# Patient Record
Sex: FEMALE | Race: BLACK OR AFRICAN AMERICAN | NOT HISPANIC OR LATINO | Employment: PART TIME | URBAN - METROPOLITAN AREA
[De-identification: names, ages, dates, MRNs, and addresses within clinical notes are randomized per-mention and may not be internally consistent; named-entity substitution may affect disease eponyms.]

---
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
## 2018-05-02 ENCOUNTER — OFFICE VISIT (OUTPATIENT)
Dept: FAMILY MEDICINE | Facility: CLINIC | Age: 18
End: 2018-05-02
Payer: COMMERCIAL

## 2018-05-02 VITALS
WEIGHT: 150 LBS | HEART RATE: 64 BPM | HEIGHT: 65 IN | TEMPERATURE: 98 F | OXYGEN SATURATION: 98 % | DIASTOLIC BLOOD PRESSURE: 62 MMHG | BODY MASS INDEX: 24.99 KG/M2 | RESPIRATION RATE: 18 BRPM | SYSTOLIC BLOOD PRESSURE: 98 MMHG

## 2018-05-02 DIAGNOSIS — Z02.0 SCHOOL PHYSICAL EXAM: Primary | ICD-10-CM

## 2018-05-02 DIAGNOSIS — Z23 NEED FOR TDAP VACCINATION: ICD-10-CM

## 2018-05-02 DIAGNOSIS — Z23 NEED FOR MENINGOCOCCAL VACCINATION: ICD-10-CM

## 2018-05-02 DIAGNOSIS — T78.40XA ALLERGIC STATE, INITIAL ENCOUNTER: ICD-10-CM

## 2018-05-02 PROCEDURE — 99999 PR PBB SHADOW E&M-EST. PATIENT-LVL III: CPT | Mod: PBBFAC,,, | Performed by: FAMILY MEDICINE

## 2018-05-02 PROCEDURE — 90472 IMMUNIZATION ADMIN EACH ADD: CPT | Mod: S$GLB,,, | Performed by: FAMILY MEDICINE

## 2018-05-02 PROCEDURE — 99214 OFFICE O/P EST MOD 30 MIN: CPT | Mod: 25,S$GLB,, | Performed by: FAMILY MEDICINE

## 2018-05-02 PROCEDURE — 90471 IMMUNIZATION ADMIN: CPT | Mod: S$GLB,,, | Performed by: FAMILY MEDICINE

## 2018-05-02 PROCEDURE — 90715 TDAP VACCINE 7 YRS/> IM: CPT | Mod: S$GLB,,, | Performed by: FAMILY MEDICINE

## 2018-05-02 PROCEDURE — 90734 MENACWYD/MENACWYCRM VACC IM: CPT | Mod: S$GLB,,, | Performed by: FAMILY MEDICINE

## 2018-05-02 PROCEDURE — 3008F BODY MASS INDEX DOCD: CPT | Mod: CPTII,S$GLB,, | Performed by: FAMILY MEDICINE

## 2018-05-02 RX ORDER — FLUTICASONE PROPIONATE 50 MCG
1 SPRAY, SUSPENSION (ML) NASAL 2 TIMES DAILY
Qty: 1 BOTTLE | Refills: 1 | Status: SHIPPED | OUTPATIENT
Start: 2018-05-02

## 2018-05-02 RX ORDER — DESLORATADINE 5 MG/1
5 TABLET ORAL DAILY
Qty: 30 TABLET | Refills: 11 | Status: SHIPPED | OUTPATIENT
Start: 2018-05-02 | End: 2018-05-24 | Stop reason: SDUPTHER

## 2018-05-02 RX ORDER — OMEPRAZOLE 10 MG/1
10 CAPSULE, DELAYED RELEASE ORAL DAILY
COMMUNITY
End: 2018-10-17

## 2018-05-02 NOTE — PROGRESS NOTES
HPI:  Dagmar Quach is a 17 y.o. year old female that  presents problemsfro school physical. She denies any   Chief Complaint   Patient presents with    Annual Exam     school physical    Establish Care     tetanus/menactra given   .     HPI    Past Medical History:   Diagnosis Date    Lactose intolerance      Social History     Social History    Marital status: Single     Spouse name: N/A    Number of children: N/A    Years of education: N/A     Occupational History    Not on file.     Social History Main Topics    Smoking status: Current Every Day Smoker     Packs/day: 0.05     Types: Cigars    Smokeless tobacco: Never Used    Alcohol use No    Drug use: No      Comment: 3-5 WEEKS    Sexual activity: Yes     Partners: Male     Other Topics Concern    Are You Pregnant Or Think You May Be? No     Social History Narrative    Lives with aunt now. 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     Past Surgical History:   Procedure Laterality Date    WISDOM TOOTH EXTRACTION       Family History   Problem Relation Age of Onset    No Known Problems Mother     No Known Problems Father     Migraines Sister     No Known Problems Sister     Melanoma Neg Hx            Review of Systems  General ROS: negative for chills, fever or weight loss  Psychological ROS: negative for hallucination, depression or suicidal ideation  Ophthalmic ROS: negative for blurry vision, photophobia or eye pain  ENT ROS: negative for epistaxis, sore throat or rhinorrhea  Respiratory ROS: no cough, shortness of breath, or wheezing  Cardiovascular ROS: no chest pain or dyspnea on exertion  Gastrointestinal ROS: no abdominal pain, change in bowel habits, or black/ bloody stools  Genito-Urinary ROS: no dysuria, trouble voiding, or hematuria  Musculoskeletal ROS: negative for gait disturbance or muscular weakness  Neurological ROS: no syncope or seizures; no ataxia  Dermatological ROS: negative for pruritis, rash and  "jaundice      Physical Exam:  BP 98/62 (BP Location: Right arm, Patient Position: Sitting, BP Method: Medium (Manual))   Pulse 64   Temp 98 °F (36.7 °C) (Oral)   Resp 18   Ht 5' 5" (1.651 m)   Wt 68 kg (150 lb)   LMP 04/30/2018 (Exact Date)   SpO2 98%   BMI 24.96 kg/m²   General appearance: alert, cooperative, no distress  Constitutional:Oriented to person, place, and time.appears well-developed and well-nourished.  HEENT: Normocephalic, atraumatic, neck symmetrical, no nasal discharge, TM - clear bilaterally   Eyes: conjunctivae/corneas clear, PERRL, EOM's intact  Lungs: clear to auscultation bilaterally, no dullness to percussion bilaterally  Heart: regular rate and rhythm without rub; no displacement of the PMI   Abdomen: soft, non-tender; bowel sounds normoactive; no organomegaly  Extremities: extremities symmetric; no clubbing, cyanosis, or edema  Integument: Skin color, texture, turgor normal; no rashes; hair distrubution normal  Neurologic: Alert and oriented X 3, normal strength, normal coordination and gait  Psychiatric: no pressured speech; normal affect; no evidence of impaired cognition   Physical Exam  LABS:    Complete Blood Count  Lab Results   Component Value Date    RBC 4.79 04/19/2017    HGB 13.9 04/19/2017    HCT 40.9 04/19/2017    MCV 85 04/19/2017    MCH 29.0 04/19/2017    MCHC 34.0 04/19/2017    RDW 12.8 04/19/2017     04/19/2017    MPV 9.8 04/19/2017    GRAN 6.1 04/19/2017    GRAN 63.1 04/19/2017    LYMPH 2.8 04/19/2017    LYMPH 28.7 04/19/2017    MONO 0.7 04/19/2017    MONO 7.6 04/19/2017    EOS 0.0 04/19/2017    BASO 0.02 04/19/2017    EOSINOPHIL 0.2 04/19/2017    BASOPHIL 0.2 04/19/2017    DIFFMETHOD Automated 04/19/2017       Comprehensive Metabolic Panel  Lab Results   Component Value Date     04/19/2017    BUN 9 04/19/2017    CREATININE 1.0 04/19/2017     04/19/2017    K 3.3 (L) 04/19/2017     04/19/2017    PROT 7.4 04/19/2017    ALBUMIN 4.0 04/19/2017 "    BILITOT 0.6 04/19/2017    AST 21 04/19/2017    ALKPHOS 54 04/19/2017    CO2 20 (L) 04/19/2017    ALT 9 (L) 04/19/2017    ANIONGAP 12 04/19/2017    EGFRNONAA >60 04/19/2017    ESTGFRAFRICA >60 04/19/2017       LIPID  No results found for: CHOL, HDL    TSH  Lab Results   Component Value Date    TSH 0.611 04/19/2017       Current Outpatient Prescriptions   Medication Sig Dispense Refill    omeprazole (PRILOSEC) 10 MG capsule Take 10 mg by mouth once daily.      desloratadine (CLARINEX) 5 mg tablet Take 1 tablet (5 mg total) by mouth once daily. 30 tablet 11    fluticasone (FLONASE) 50 mcg/actuation nasal spray 1 spray (50 mcg total) by Each Nare route 2 (two) times daily. 1 Bottle 1     No current facility-administered medications for this visit.        Assessment:    ICD-10-CM ICD-9-CM    1. School physical exam Z02.0 V70.5 CBC auto differential      Lipid panel      Comprehensive metabolic panel      TSH   2. Need for Tdap vaccination Z23 V06.1 (In Office Administered) Tdap Vaccine   3. Need for meningococcal vaccination Z23 V03.89 (In Office Administered) Meningococcal Conjugate - MCV4P (MENACTRA)   4. Allergic state, initial encounter T78.40XA 995.3 desloratadine (CLARINEX) 5 mg tablet      fluticasone (FLONASE) 50 mcg/actuation nasal spray         Plan:    No Follow-up on file.          Taya Rodríguez MD

## 2018-05-24 DIAGNOSIS — T78.40XA ALLERGIC STATE, INITIAL ENCOUNTER: ICD-10-CM

## 2018-05-25 RX ORDER — DESLORATADINE 5 MG/1
5 TABLET ORAL DAILY
Qty: 30 TABLET | Refills: 2 | Status: SHIPPED | OUTPATIENT
Start: 2018-05-25 | End: 2018-05-28 | Stop reason: SDUPTHER

## 2018-05-28 DIAGNOSIS — T78.40XA ALLERGIC STATE, INITIAL ENCOUNTER: ICD-10-CM

## 2018-05-31 RX ORDER — DESLORATADINE 5 MG/1
5 TABLET ORAL DAILY
Qty: 30 TABLET | Refills: 2 | Status: SHIPPED | OUTPATIENT
Start: 2018-05-31 | End: 2019-05-31

## 2018-06-12 ENCOUNTER — OFFICE VISIT (OUTPATIENT)
Dept: FAMILY MEDICINE | Facility: CLINIC | Age: 18
End: 2018-06-12
Payer: COMMERCIAL

## 2018-06-12 VITALS
RESPIRATION RATE: 18 BRPM | BODY MASS INDEX: 24.49 KG/M2 | HEIGHT: 65 IN | TEMPERATURE: 98 F | OXYGEN SATURATION: 98 % | HEART RATE: 65 BPM | DIASTOLIC BLOOD PRESSURE: 68 MMHG | SYSTOLIC BLOOD PRESSURE: 98 MMHG | WEIGHT: 147 LBS

## 2018-06-12 DIAGNOSIS — R10.9 ABDOMINAL PAIN, UNSPECIFIED ABDOMINAL LOCATION: Primary | ICD-10-CM

## 2018-06-12 PROCEDURE — 99214 OFFICE O/P EST MOD 30 MIN: CPT | Mod: S$GLB,,, | Performed by: FAMILY MEDICINE

## 2018-06-12 PROCEDURE — 3008F BODY MASS INDEX DOCD: CPT | Mod: CPTII,S$GLB,, | Performed by: FAMILY MEDICINE

## 2018-06-12 PROCEDURE — 99999 PR PBB SHADOW E&M-EST. PATIENT-LVL IV: CPT | Mod: PBBFAC,,, | Performed by: FAMILY MEDICINE

## 2018-06-13 ENCOUNTER — TELEPHONE (OUTPATIENT)
Dept: FAMILY MEDICINE | Facility: CLINIC | Age: 18
End: 2018-06-13

## 2018-06-13 NOTE — TELEPHONE ENCOUNTER
----- Message from Brittani King sent at 6/13/2018  1:38 PM CDT -----  Contact: Pt  Pt is requesting a callback says she needs to get a copy of her proof of immunization compliance faxed over to [phone]    Pt can be reached at [phone] thanks

## 2018-06-18 NOTE — PROGRESS NOTES
"HPI:  Dagmar Quach is a 18 y.o. year old female that  presents for lab results.  Chief Complaint   Patient presents with    Follow-up     lab results   .     HPI      Past Medical History:   Diagnosis Date    Lactose intolerance      Social History     Social History    Marital status: Single     Spouse name: N/A    Number of children: N/A    Years of education: N/A     Occupational History    Not on file.     Social History Main Topics    Smoking status: Current Every Day Smoker     Packs/day: 0.05     Types: Cigars    Smokeless tobacco: Never Used    Alcohol use No    Drug use: No      Comment: 3-5 WEEKS    Sexual activity: Yes     Partners: Male     Other Topics Concern    Are You Pregnant Or Think You May Be? No     Social History Narrative    Lives with aunt now. 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     Past Surgical History:   Procedure Laterality Date    WISDOM TOOTH EXTRACTION       Family History   Problem Relation Age of Onset    No Known Problems Mother     No Known Problems Father     Migraines Sister     No Known Problems Sister     Melanoma Neg Hx            Review of Systems  General ROS: negative for chills, fever or weight loss  ENT ROS: negative for epistaxis, sore throat or rhinorrhea  Respiratory ROS: no cough, shortness of breath, or wheezing  Cardiovascular ROS: no chest pain or dyspnea on exertion  Gastrointestinal ROS:pos abdominal pain, change in bowel habits, or black/ bloody stools    Physical Exam:  BP 98/68   Pulse 65   Temp 98.4 °F (36.9 °C) (Oral)   Resp 18   Ht 5' 5" (1.651 m)   Wt 66.7 kg (147 lb)   LMP 06/05/2018 (Exact Date)   SpO2 98%   BMI 24.46 kg/m²   General appearance: alert, cooperative, no distress  Constitutional:Oriented to person, place, and time.appears well-developed and well-nourished.  HEENT: Normocephalic, atraumatic, neck symmetrical, no nasal discharge, TM- clear bilaterally  Lungs: clear to auscultation " bilaterally, no dullness to percussion bilaterally  Heart: regular rate and rhythm without rub; no displacement of the PMI , S1&S2 present  Abdomen: soft, pos epigastric tenderness; bowel sounds normoactive; no organomegaly  Physical Exam    LABS:    Complete Blood Count  Lab Results   Component Value Date    RBC 4.50 06/07/2018    HGB 13.5 06/07/2018    HCT 40.7 06/07/2018    MCV 90 06/07/2018    MCH 30.0 06/07/2018    MCHC 33.2 06/07/2018    RDW 13.2 06/07/2018     06/07/2018    MPV 10.0 06/07/2018    GRAN 6.9 06/07/2018    GRAN 75.9 (H) 06/07/2018    LYMPH 1.5 06/07/2018    LYMPH 17.1 (L) 06/07/2018    MONO 0.6 06/07/2018    MONO 6.8 06/07/2018    EOS 0.0 06/07/2018    BASO 0.01 06/07/2018    EOSINOPHIL 0.1 06/07/2018    BASOPHIL 0.1 06/07/2018    DIFFMETHOD Automated 06/07/2018       Comprehensive Metabolic Panel  Lab Results   Component Value Date    GLU 80 06/07/2018    BUN 9 06/07/2018    CREATININE 0.79 06/07/2018     06/07/2018    K 3.9 06/07/2018     06/07/2018    PROT 7.0 06/07/2018    ALBUMIN 3.8 06/07/2018    BILITOT 0.6 06/07/2018    AST 23 06/07/2018    ALKPHOS 53 06/07/2018    CO2 24 06/07/2018    ALT 20 06/07/2018    ANIONGAP 7 (L) 06/07/2018    EGFRNONAA >60.0 06/07/2018    ESTGFRAFRICA >60.0 06/07/2018       LIPID  Lab Results   Component Value Date    CHOL 101 (L) 06/07/2018    HDL 38 (L) 06/07/2018         TSH  Lab Results   Component Value Date    TSH 0.856 06/07/2018       Current Outpatient Prescriptions   Medication Sig Dispense Refill    desloratadine (CLARINEX) 5 mg tablet Take 1 tablet (5 mg total) by mouth once daily. 30 tablet 2    fluticasone (FLONASE) 50 mcg/actuation nasal spray 1 spray (50 mcg total) by Each Nare route 2 (two) times daily. 1 Bottle 1    omeprazole (PRILOSEC) 10 MG capsule Take 10 mg by mouth once daily.       No current facility-administered medications for this visit.        Assessment:    ICD-10-CM ICD-9-CM    1. Abdominal pain, unspecified  abdominal location R10.9 789.00 H. pylori antigen, stool         Plan:    Follow-up in 8 weeks (on 8/10/2018).          Taya Rodríguez MD

## 2018-10-17 ENCOUNTER — OFFICE VISIT (OUTPATIENT)
Dept: FAMILY MEDICINE | Facility: CLINIC | Age: 18
End: 2018-10-17
Payer: COMMERCIAL

## 2018-10-17 VITALS
SYSTOLIC BLOOD PRESSURE: 98 MMHG | DIASTOLIC BLOOD PRESSURE: 62 MMHG | HEIGHT: 65 IN | WEIGHT: 148 LBS | BODY MASS INDEX: 24.66 KG/M2 | RESPIRATION RATE: 18 BRPM | TEMPERATURE: 98 F | HEART RATE: 59 BPM | OXYGEN SATURATION: 99 %

## 2018-10-17 DIAGNOSIS — Z00.00 ANNUAL PHYSICAL EXAM: ICD-10-CM

## 2018-10-17 DIAGNOSIS — R41.840 LACK OF CONCENTRATION: Primary | ICD-10-CM

## 2018-10-17 DIAGNOSIS — J32.9 CHRONIC CONGESTION OF PARANASAL SINUS: ICD-10-CM

## 2018-10-17 PROCEDURE — 99214 OFFICE O/P EST MOD 30 MIN: CPT | Mod: S$GLB,,, | Performed by: FAMILY MEDICINE

## 2018-10-17 PROCEDURE — 99999 PR PBB SHADOW E&M-EST. PATIENT-LVL IV: CPT | Mod: PBBFAC,,, | Performed by: FAMILY MEDICINE

## 2018-10-17 PROCEDURE — 3008F BODY MASS INDEX DOCD: CPT | Mod: CPTII,S$GLB,, | Performed by: FAMILY MEDICINE

## 2018-10-17 NOTE — PROGRESS NOTES
HPI:  Dagmar Quach is a 18 y.o. year old female that  presents with request to tested for ADHD. She has been asked by teachers if she has it. She states that she is having difficulty paying attention in school.She feel s that has been this way since 7 th grade. 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  Chief Complaint   Patient presents with    ADHD     pt would like to be tested     Tonsillitis   .     HPI      Past Medical History:   Diagnosis Date    Lactose intolerance      Social History     Socioeconomic History    Marital status: Single     Spouse name: Not on file    Number of children: Not on file    Years of education: Not on file    Highest education level: Not on file   Social Needs    Financial resource strain: Not on file    Food insecurity - worry: Not on file    Food insecurity - inability: Not on file    Transportation needs - medical: Not on file    Transportation needs - non-medical: Not on file   Occupational History    Not on file   Tobacco Use    Smoking status: Current Every Day Smoker     Packs/day: 0.05     Types: Cigars    Smokeless tobacco: Never Used   Substance and Sexual Activity    Alcohol use: No    Drug use: No     Comment: 3-5 WEEKS    Sexual activity: Yes     Partners: Male   Other Topics Concern    Are you pregnant or think you may be? No    Breast-feeding Not Asked   Social History Narrative    Lives with aunt now. 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     Past Surgical History:   Procedure Laterality Date    WISDOM TOOTH EXTRACTION       Family History   Problem Relation Age of Onset    No Known Problems Mother     No Known Problems Father     Migraines Sister     No Known Problems Sister     Melanoma Neg Hx            Review of Systems  General ROS: negative for chills, fever or weight loss  ENT ROS: negative for epistaxis, sore throat or  "rhinorrhea  Respiratory ROS: no cough, shortness of breath, or wheezing  Cardiovascular ROS: no chest pain or dyspnea on exertion  Gastrointestinal ROS: no abdominal pain, change in bowel habits, or black/ bloody stools    Physical Exam:  BP 98/62   Pulse (!) 59   Temp 97.9 °F (36.6 °C) (Oral)   Resp 18   Ht 5' 5" (1.651 m)   Wt 67.1 kg (148 lb)   LMP 10/03/2018 (Exact Date)   SpO2 99%   BMI 24.63 kg/m²  this to II 70 on oral  General appearance: alert, cooperative, no distress  Constitutional:Oriented to person, place, and time.appears well-developed and well-nourished.  HEENT: Normocephalic, atraumatic, neck symmetrical, no nasal discharge, TM- clear bilaterally  Lungs: clear to auscultation bilaterally, no dullness to percussion bilaterally  Heart: regular rate and rhythm without rub; no displacement of the PMI , S1&S2 present  Abdomen: soft, non-tender; bowel sounds normoactive; no organomegaly  Physical Exam    LABS:    Complete Blood Count  Lab Results   Component Value Date    RBC 4.56 10/19/2018    HGB 13.5 10/19/2018    HCT 42.1 10/19/2018    MCV 92 10/19/2018    MCH 29.6 10/19/2018    MCHC 32.1 10/19/2018    RDW 13.3 10/19/2018     10/19/2018    MPV 10.9 10/19/2018    GRAN 2.4 10/19/2018    GRAN 42.5 10/19/2018    LYMPH 2.5 10/19/2018    LYMPH 44.8 10/19/2018    MONO 0.5 10/19/2018    MONO 9.4 10/19/2018    EOS 0.1 10/19/2018    BASO 0.02 10/19/2018    EOSINOPHIL 2.5 10/19/2018    BASOPHIL 0.4 10/19/2018    DIFFMETHOD Automated 10/19/2018       Comprehensive Metabolic Panel  Lab Results   Component Value Date    GLU 80 10/19/2018    BUN 9 10/19/2018    CREATININE 0.8 10/19/2018     10/19/2018    K 4.1 10/19/2018     10/19/2018    PROT 6.5 10/19/2018    ALBUMIN 3.3 (L) 10/19/2018    BILITOT 0.4 10/19/2018    AST 22 10/19/2018    ALKPHOS 49 (L) 10/19/2018    CO2 23 10/19/2018    ALT 10 10/19/2018    ANIONGAP 7 (L) 10/19/2018    EGFRNONAA >60.0 10/19/2018    ESTGFRAFRICA >60.0 " 10/19/2018       LIPID  Lab Results   Component Value Date    CHOL 108 (L) 10/19/2018    HDL 42 10/19/2018         TSH  Lab Results   Component Value Date    TSH 0.794 10/19/2018       Current Outpatient Medications   Medication Sig Dispense Refill    desloratadine (CLARINEX) 5 mg tablet Take 1 tablet (5 mg total) by mouth once daily. 30 tablet 2    fluticasone (FLONASE) 50 mcg/actuation nasal spray 1 spray (50 mcg total) by Each Nare route 2 (two) times daily. 1 Bottle 1     No current facility-administered medications for this visit.        Assessment:    ICD-10-CM ICD-9-CM    1. Lack of concentration R41.840 799.51 Ambulatory Referral to Behavioral Health   2. Chronic congestion of paranasal sinus J32.9 473.9 Ambulatory consult to Allergy   3. Annual physical exam Z00.00 V70.0 Comprehensive metabolic panel      Lipid panel      CBC auto differential      TSH         Plan for:  Annual labs ordered for he  Follow-up in 9 days (on 10/26/2018).          Taya Rodríguez MD

## 2018-10-19 ENCOUNTER — LAB VISIT (OUTPATIENT)
Dept: LAB | Facility: HOSPITAL | Age: 18
End: 2018-10-19
Attending: FAMILY MEDICINE
Payer: COMMERCIAL

## 2018-10-19 DIAGNOSIS — Z00.00 ANNUAL PHYSICAL EXAM: ICD-10-CM

## 2018-10-19 LAB
ALBUMIN SERPL BCP-MCNC: 3.3 G/DL
ALP SERPL-CCNC: 49 U/L
ALT SERPL W/O P-5'-P-CCNC: 10 U/L
ANION GAP SERPL CALC-SCNC: 7 MMOL/L
AST SERPL-CCNC: 22 U/L
BASOPHILS # BLD AUTO: 0.02 K/UL
BASOPHILS NFR BLD: 0.4 %
BILIRUB SERPL-MCNC: 0.4 MG/DL
BUN SERPL-MCNC: 9 MG/DL
CALCIUM SERPL-MCNC: 8.9 MG/DL
CHLORIDE SERPL-SCNC: 108 MMOL/L
CHOLEST SERPL-MCNC: 108 MG/DL
CHOLEST/HDLC SERPL: 2.6 {RATIO}
CO2 SERPL-SCNC: 23 MMOL/L
CREAT SERPL-MCNC: 0.8 MG/DL
DIFFERENTIAL METHOD: NORMAL
EOSINOPHIL # BLD AUTO: 0.1 K/UL
EOSINOPHIL NFR BLD: 2.5 %
ERYTHROCYTE [DISTWIDTH] IN BLOOD BY AUTOMATED COUNT: 13.3 %
EST. GFR  (AFRICAN AMERICAN): >60 ML/MIN/1.73 M^2
EST. GFR  (NON AFRICAN AMERICAN): >60 ML/MIN/1.73 M^2
GLUCOSE SERPL-MCNC: 80 MG/DL
HCT VFR BLD AUTO: 42.1 %
HDLC SERPL-MCNC: 42 MG/DL
HDLC SERPL: 38.9 %
HGB BLD-MCNC: 13.5 G/DL
IMM GRANULOCYTES # BLD AUTO: 0.02 K/UL
IMM GRANULOCYTES NFR BLD AUTO: 0.4 %
LDLC SERPL CALC-MCNC: 56.8 MG/DL
LYMPHOCYTES # BLD AUTO: 2.5 K/UL
LYMPHOCYTES NFR BLD: 44.8 %
MCH RBC QN AUTO: 29.6 PG
MCHC RBC AUTO-ENTMCNC: 32.1 G/DL
MCV RBC AUTO: 92 FL
MONOCYTES # BLD AUTO: 0.5 K/UL
MONOCYTES NFR BLD: 9.4 %
NEUTROPHILS # BLD AUTO: 2.4 K/UL
NEUTROPHILS NFR BLD: 42.5 %
NONHDLC SERPL-MCNC: 66 MG/DL
NRBC BLD-RTO: 0 /100 WBC
PLATELET # BLD AUTO: 206 K/UL
PMV BLD AUTO: 10.9 FL
POTASSIUM SERPL-SCNC: 4.1 MMOL/L
PROT SERPL-MCNC: 6.5 G/DL
RBC # BLD AUTO: 4.56 M/UL
SODIUM SERPL-SCNC: 138 MMOL/L
TRIGL SERPL-MCNC: 46 MG/DL
TSH SERPL DL<=0.005 MIU/L-ACNC: 0.79 UIU/ML
WBC # BLD AUTO: 5.62 K/UL

## 2018-10-19 PROCEDURE — 80053 COMPREHEN METABOLIC PANEL: CPT

## 2018-10-19 PROCEDURE — 36415 COLL VENOUS BLD VENIPUNCTURE: CPT | Mod: PO

## 2018-10-19 PROCEDURE — 80061 LIPID PANEL: CPT

## 2018-10-19 PROCEDURE — 84443 ASSAY THYROID STIM HORMONE: CPT

## 2018-10-19 PROCEDURE — 85025 COMPLETE CBC W/AUTO DIFF WBC: CPT

## 2018-10-26 ENCOUNTER — OFFICE VISIT (OUTPATIENT)
Dept: FAMILY MEDICINE | Facility: CLINIC | Age: 18
End: 2018-10-26
Payer: COMMERCIAL

## 2018-10-26 VITALS
TEMPERATURE: 98 F | SYSTOLIC BLOOD PRESSURE: 94 MMHG | WEIGHT: 146 LBS | RESPIRATION RATE: 18 BRPM | DIASTOLIC BLOOD PRESSURE: 64 MMHG | OXYGEN SATURATION: 99 % | HEIGHT: 65 IN | HEART RATE: 59 BPM | BODY MASS INDEX: 24.32 KG/M2

## 2018-10-26 DIAGNOSIS — R41.840 LACK OF CONCENTRATION: Primary | ICD-10-CM

## 2018-10-26 PROCEDURE — 3008F BODY MASS INDEX DOCD: CPT | Mod: CPTII,S$GLB,, | Performed by: FAMILY MEDICINE

## 2018-10-26 PROCEDURE — 99214 OFFICE O/P EST MOD 30 MIN: CPT | Mod: S$GLB,,, | Performed by: FAMILY MEDICINE

## 2018-10-26 PROCEDURE — 99999 PR PBB SHADOW E&M-EST. PATIENT-LVL III: CPT | Mod: PBBFAC,,, | Performed by: FAMILY MEDICINE

## 2018-10-29 NOTE — PROGRESS NOTES
The HPI:  Dagmar Quach is a 18 y.o. year old female that  presents for follow-up of labs.  Patient has not had appointment with behavioral therapist for evaluation of attention deficit at this point.  Chief Complaint   Patient presents with    Follow-up     lab results   .     HPI      Past Medical History:   Diagnosis Date    Lactose intolerance      Social History     Socioeconomic History    Marital status: Single     Spouse name: Not on file    Number of children: Not on file    Years of education: Not on file    Highest education level: Not on file   Social Needs    Financial resource strain: Not on file    Food insecurity - worry: Not on file    Food insecurity - inability: Not on file    Transportation needs - medical: Not on file    Transportation needs - non-medical: Not on file   Occupational History    Not on file   Tobacco Use    Smoking status: Current Every Day Smoker     Packs/day: 0.05     Types: Cigars    Smokeless tobacco: Never Used   Substance and Sexual Activity    Alcohol use: No    Drug use: No     Comment: 3-5 WEEKS    Sexual activity: Yes     Partners: Male   Other Topics Concern    Are you pregnant or think you may be? No    Breast-feeding Not Asked   Social History Narrative    Lives with aunt now. 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     Past Surgical History:   Procedure Laterality Date    WISDOM TOOTH EXTRACTION       Family History   Problem Relation Age of Onset    No Known Problems Mother     No Known Problems Father     Migraines Sister     No Known Problems Sister     Melanoma Neg Hx            Review of Systems  General ROS: negative for chills, fever or weight loss  ENT ROS: negative for epistaxis, sore throat or rhinorrhea  Respiratory ROS: no cough, shortness of breath, or wheezing  Cardiovascular ROS: no chest pain or dyspnea on exertion  Gastrointestinal ROS: no abdominal pain, change in bowel habits, or black/ bloody  "stools    Physical Exam:  BP 94/64   Pulse (!) 59   Temp 98.4 °F (36.9 °C) (Oral)   Resp 18   Ht 5' 5" (1.651 m)   Wt 66.2 kg (146 lb)   LMP 10/03/2018 (Exact Date)   SpO2 99%   BMI 24.30 kg/m²   General appearance: alert, cooperative, no distress  Constitutional:Oriented to person, place, and time.appears well-developed and well-nourished.  HEENT: Normocephalic, atraumatic, neck symmetrical, no nasal discharge, TM- clear bilaterally  Lungs: clear to auscultation bilaterally, no dullness to percussion bilaterally  Heart: regular rate and rhythm without rub; no displacement of the PMI , S1&S2 present  Abdomen: soft, non-tender; bowel sounds normoactive; no organomegaly  Physical Exam    LABS:    Complete Blood Count  Lab Results   Component Value Date    RBC 4.56 10/19/2018    HGB 13.5 10/19/2018    HCT 42.1 10/19/2018    MCV 92 10/19/2018    MCH 29.6 10/19/2018    MCHC 32.1 10/19/2018    RDW 13.3 10/19/2018     10/19/2018    MPV 10.9 10/19/2018    GRAN 2.4 10/19/2018    GRAN 42.5 10/19/2018    LYMPH 2.5 10/19/2018    LYMPH 44.8 10/19/2018    MONO 0.5 10/19/2018    MONO 9.4 10/19/2018    EOS 0.1 10/19/2018    BASO 0.02 10/19/2018    EOSINOPHIL 2.5 10/19/2018    BASOPHIL 0.4 10/19/2018    DIFFMETHOD Automated 10/19/2018       Comprehensive Metabolic Panel  Lab Results   Component Value Date    GLU 80 10/19/2018    BUN 9 10/19/2018    CREATININE 0.8 10/19/2018     10/19/2018    K 4.1 10/19/2018     10/19/2018    PROT 6.5 10/19/2018    ALBUMIN 3.3 (L) 10/19/2018    BILITOT 0.4 10/19/2018    AST 22 10/19/2018    ALKPHOS 49 (L) 10/19/2018    CO2 23 10/19/2018    ALT 10 10/19/2018    ANIONGAP 7 (L) 10/19/2018    EGFRNONAA >60.0 10/19/2018    ESTGFRAFRICA >60.0 10/19/2018       LIPID  Lab Results   Component Value Date    CHOL 108 (L) 10/19/2018    HDL 42 10/19/2018         TSH  Lab Results   Component Value Date    TSH 0.794 10/19/2018       Current Outpatient Medications   Medication Sig " Dispense Refill    desloratadine (CLARINEX) 5 mg tablet Take 1 tablet (5 mg total) by mouth once daily. 30 tablet 2    fluticasone (FLONASE) 50 mcg/actuation nasal spray 1 spray (50 mcg total) by Each Nare route 2 (two) times daily. 1 Bottle 1     No current facility-administered medications for this visit.        Assessment:    ICD-10-CM ICD-9-CM    1. Lack of concentration R41.840 799.51          Plan:    No Follow-up on file.          Taya Rodríguez MD

## 2018-11-27 ENCOUNTER — OFFICE VISIT (OUTPATIENT)
Dept: ALLERGY | Facility: CLINIC | Age: 18
End: 2018-11-27
Payer: COMMERCIAL

## 2018-11-27 VITALS — HEIGHT: 65 IN | WEIGHT: 148.56 LBS | BODY MASS INDEX: 24.75 KG/M2

## 2018-11-27 DIAGNOSIS — J31.0 CHRONIC RHINITIS: Primary | ICD-10-CM

## 2018-11-27 DIAGNOSIS — H10.423 SIMPLE CHRONIC CONJUNCTIVITIS OF BOTH EYES: ICD-10-CM

## 2018-11-27 PROCEDURE — 99244 OFF/OP CNSLTJ NEW/EST MOD 40: CPT | Mod: S$GLB,,, | Performed by: ALLERGY & IMMUNOLOGY

## 2018-11-27 PROCEDURE — 99999 PR PBB SHADOW E&M-EST. PATIENT-LVL II: CPT | Mod: PBBFAC,,, | Performed by: ALLERGY & IMMUNOLOGY

## 2018-11-27 NOTE — LETTER
November 27, 2018      Taya Rodríguez MD  [address]  [address]  [place] [address]           [place] [facility]  [address]  [place] [address]  Phone: [phone]          Patient: Dagmar Quach   MR Number: [id number]   YOB: 2000   Date of Visit: 11/27/2018       Dear Dr. Taya Rodríguez:    Thank you for referring Dagmar Quach to me for evaluation. Attached you will find relevant portions of my assessment and plan of care.    If you have questions, please do not hesitate to call me. I look forward to following Dagmar Quach along with you.    Sincerely,    Gina Banegas MD    Enclosure  CC:  No Recipients    If you would like to receive this communication electronically, please contact [email or web address] or [phone] to request more information on [facility] [employer] access.    For providers and/or their staff who would like to refer a patient to [facility], please contact us through our one-stop-shop provider referral line, [facility], at [phone].    If you feel you have received this communication in error or would no longer like to receive these types of communications, please e-mail [email or web address]

## 2018-11-27 NOTE — PROGRESS NOTES
Subjective:       Patient ID: Dagmar Quach is a 18 y.o. female.    Chief Complaint:  Nasal Congestion and Allergic Rhinitis  (itchy watery eyes, runny nose, sneezing, post nasal drip )      17 yo woman presents for consult from Dr Taya Rodríguez for possible allergies. She states she always has PND and throat gets irritated. Wakes with sore throat often. Had strep often as child. She has stuffy nose, sneeze, runny nose, watery eyes and itchy eyes and nose. Ears will pop. occ has tight chest. Has all year long. early AM is worse, clears some in day then worse again at night. Worse outside. worse around dust. Uses Flonase and Clarinex daily and helps some. Had asthma used inhaler with exercise in middle school but none since. No eczema. No known food, insect or latex allergy. No other medical issues. No surgery.        Environmental History: see history section for home environment  Review of Systems   Constitutional: Negative for appetite change, chills, fatigue and fever.   HENT: Positive for congestion, ear pain, postnasal drip, rhinorrhea, sneezing and sore throat. Negative for ear discharge, facial swelling, nosebleeds, sinus pressure, trouble swallowing and voice change.    Eyes: Positive for discharge and itching. Negative for redness and visual disturbance.   Respiratory: Positive for chest tightness. Negative for cough, choking, shortness of breath and wheezing.    Cardiovascular: Negative for chest pain, palpitations and leg swelling.   Gastrointestinal: Negative for abdominal distention, abdominal pain, constipation, diarrhea, nausea and vomiting.   Genitourinary: Negative for difficulty urinating.   Musculoskeletal: Negative for arthralgias, gait problem, joint swelling and myalgias.   Skin: Negative for color change and rash.   Neurological: Negative for dizziness, syncope, weakness, light-headedness and headaches.   Hematological: Negative for adenopathy. Does not bruise/bleed easily.    Psychiatric/Behavioral: Negative for agitation, behavioral problems, confusion and sleep disturbance. The patient is not nervous/anxious.         Objective:      Physical Exam   Constitutional: She is oriented to person, place, and time. She appears well-developed and well-nourished. No distress.   HENT:   Head: Normocephalic and atraumatic.   Right Ear: Hearing, tympanic membrane, external ear and ear canal normal.   Left Ear: Hearing, tympanic membrane, external ear and ear canal normal.   Nose: No mucosal edema, rhinorrhea, sinus tenderness or septal deviation. No epistaxis. Right sinus exhibits no maxillary sinus tenderness and no frontal sinus tenderness. Left sinus exhibits no maxillary sinus tenderness and no frontal sinus tenderness.   Mouth/Throat: Uvula is midline, oropharynx is clear and moist and mucous membranes are normal. No uvula swelling.   Eyes: Conjunctivae are normal. Right eye exhibits no discharge. Left eye exhibits no discharge.   Neck: Normal range of motion. No thyromegaly present.   Cardiovascular: Normal rate, regular rhythm and normal heart sounds.   No murmur heard.  Pulmonary/Chest: Effort normal and breath sounds normal. No respiratory distress. She has no wheezes.   Abdominal: Soft. She exhibits no distension. There is no tenderness.   Musculoskeletal: Normal range of motion. She exhibits no edema or tenderness.   Lymphadenopathy:     She has no cervical adenopathy.   Neurological: She is alert and oriented to person, place, and time.   Skin: Skin is warm and dry. No rash noted. No erythema.   Psychiatric: She has a normal mood and affect. Her behavior is normal. Judgment and thought content normal.   Nursing note and vitals reviewed.      Laboratory:   none performed   Assessment:       1. Chronic rhinitis    2. Simple chronic conjunctivitis of both eyes         Plan:       1. RTC 1 week for skin test off all antihistamines  2. continue Flonase 2 SEN daily   3. Dr Rodríguez notified  of completed consult via Epic

## 2018-12-04 ENCOUNTER — OFFICE VISIT (OUTPATIENT)
Dept: ALLERGY | Facility: CLINIC | Age: 18
End: 2018-12-04
Payer: COMMERCIAL

## 2018-12-04 VITALS
HEART RATE: 54 BPM | SYSTOLIC BLOOD PRESSURE: 100 MMHG | HEIGHT: 65 IN | BODY MASS INDEX: 24.57 KG/M2 | DIASTOLIC BLOOD PRESSURE: 72 MMHG | WEIGHT: 147.5 LBS

## 2018-12-04 DIAGNOSIS — H10.423 SIMPLE CHRONIC CONJUNCTIVITIS OF BOTH EYES: ICD-10-CM

## 2018-12-04 DIAGNOSIS — J31.0 CHRONIC RHINITIS: Primary | ICD-10-CM

## 2018-12-04 PROCEDURE — 95004 PERQ TESTS W/ALRGNC XTRCS: CPT | Mod: S$GLB,,, | Performed by: ALLERGY & IMMUNOLOGY

## 2018-12-04 PROCEDURE — 3008F BODY MASS INDEX DOCD: CPT | Mod: CPTII,S$GLB,, | Performed by: ALLERGY & IMMUNOLOGY

## 2018-12-04 PROCEDURE — 99999 PR PBB SHADOW E&M-EST. PATIENT-LVL III: CPT | Mod: PBBFAC,,, | Performed by: ALLERGY & IMMUNOLOGY

## 2018-12-04 PROCEDURE — 99213 OFFICE O/P EST LOW 20 MIN: CPT | Mod: 25,S$GLB,, | Performed by: ALLERGY & IMMUNOLOGY

## 2018-12-04 RX ORDER — AZELASTINE 1 MG/ML
2 SPRAY, METERED NASAL 2 TIMES DAILY
Qty: 30 ML | Refills: 12 | Status: SHIPPED | OUTPATIENT
Start: 2018-12-04

## 2018-12-04 NOTE — PROGRESS NOTES
Subjective:       Patient ID: Dagmar Quach is a 18 y.o. female.    Chief Complaint:  Other (here for skin testing, no new complaints, no antihistamines)      17 yo woman presents for continued evaluation of chronic rhinitis and conjunctivitis. She was last seen 11/27/18. She has been off Clarinex since then she has slight increase drip and stuffy runny nose but nothing bad. No other changes since last visit.     Prior History taken 11/27/18: consult from Dr Taya Rodríguez for possible allergies. She states she always has PND and throat gets irritated. Wakes with sore throat often. Had strep often as child. She has stuffy nose, sneeze, runny nose, watery eyes and itchy eyes and nose. Ears will pop. occ has tight chest. Has all year long. early AM is worse, clears some in day then worse again at night. Worse outside. worse around dust. Uses Flonase and Clarinex daily and helps some. Had asthma used inhaler with exercise in middle school but none since. No eczema. No known food, insect or latex allergy. No other medical issues. No surgery.        Environmental History: see history section for home environment  Review of Systems   Constitutional: Negative for appetite change, chills, fatigue and fever.   HENT: Positive for congestion, ear pain, postnasal drip, rhinorrhea, sneezing and sore throat. Negative for ear discharge, facial swelling, nosebleeds, sinus pressure, trouble swallowing and voice change.    Eyes: Positive for discharge and itching. Negative for redness and visual disturbance.   Respiratory: Positive for chest tightness. Negative for cough, choking, shortness of breath and wheezing.    Cardiovascular: Negative for chest pain, palpitations and leg swelling.   Gastrointestinal: Negative for abdominal distention, abdominal pain, constipation, diarrhea, nausea and vomiting.   Genitourinary: Negative for difficulty urinating.   Musculoskeletal: Negative for arthralgias, gait problem, joint swelling and  myalgias.   Skin: Negative for color change and rash.   Neurological: Negative for dizziness, syncope, weakness, light-headedness and headaches.   Hematological: Negative for adenopathy. Does not bruise/bleed easily.   Psychiatric/Behavioral: Negative for agitation, behavioral problems, confusion and sleep disturbance. The patient is not nervous/anxious.         Objective:      Physical Exam   Constitutional: She is oriented to person, place, and time. She appears well-developed and well-nourished. No distress.   HENT:   Head: Normocephalic and atraumatic.   Right Ear: Hearing, tympanic membrane, external ear and ear canal normal.   Left Ear: Hearing, tympanic membrane, external ear and ear canal normal.   Nose: No mucosal edema, rhinorrhea, sinus tenderness or septal deviation. No epistaxis. Right sinus exhibits no maxillary sinus tenderness and no frontal sinus tenderness. Left sinus exhibits no maxillary sinus tenderness and no frontal sinus tenderness.   Mouth/Throat: Uvula is midline, oropharynx is clear and moist and mucous membranes are normal. No uvula swelling.   Eyes: Conjunctivae are normal. Right eye exhibits no discharge. Left eye exhibits no discharge.   Neck: Normal range of motion. No thyromegaly present.   Cardiovascular: Normal rate, regular rhythm and normal heart sounds.   No murmur heard.  Pulmonary/Chest: Effort normal and breath sounds normal. No respiratory distress. She has no wheezes.   Abdominal: Soft. She exhibits no distension. There is no tenderness.   Musculoskeletal: Normal range of motion. She exhibits no edema or tenderness.   Lymphadenopathy:     She has no cervical adenopathy.   Neurological: She is alert and oriented to person, place, and time.   Skin: Skin is warm and dry. No rash noted. No erythema.   Psychiatric: She has a normal mood and affect. Her behavior is normal. Judgment and thought content normal.   Nursing note and vitals reviewed.      Laboratory:   Percutaneous  Skin Testing: prick skin test inhalants #60, 12/4/18: 3+ histamine control and negative saline control and rest negative, see flow sheet  Assessment:       1. Chronic rhinitis    2. Simple chronic conjunctivitis of both eyes         Plan:       1. continue Flonase 2 SEN daily   2. Trial azelastine 2 SEN BID as needed

## 2019-02-18 ENCOUNTER — TELEPHONE (OUTPATIENT)
Dept: FAMILY MEDICINE | Facility: CLINIC | Age: 19
End: 2019-02-18
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
## 2019-02-18 NOTE — TELEPHONE ENCOUNTER
----- Message from Kami Del Angel sent at 2/18/2019 11:31 AM CST -----  Contact: Patient  Patient is having back pain and she would like to be seen today     Callback:[phone]    Thank you

## 2019-04-30 ENCOUNTER — TELEPHONE (OUTPATIENT)
Dept: OBSTETRICS AND GYNECOLOGY | Facility: CLINIC | Age: 19
End: 2019-04-30

## 2019-04-30 NOTE — TELEPHONE ENCOUNTER
----- Message from Regino Haywood sent at 4/30/2019 12:00 PM CDT -----  Contact: JAI VAUGHN [[id number]]  Name of Who is Calling: JAI VAUGHN [[id number]]      What is the request in detail: Patient 14 weeks ob, would like to speak with staff in regards to transferring care from [facility]. Patient requesting to be seen tomorrow.      Can the clinic reply by [facility]: no      What Number to Call Back if not in [facility]: [phone]

## 2019-05-02 ENCOUNTER — TELEPHONE (OUTPATIENT)
Dept: OBSTETRICS AND GYNECOLOGY | Facility: CLINIC | Age: 19
End: 2019-05-02

## 2019-05-07 ENCOUNTER — PROCEDURE VISIT (OUTPATIENT)
Dept: MATERNAL FETAL MEDICINE | Facility: CLINIC | Age: 19
End: 2019-05-07
Payer: COMMERCIAL

## 2019-05-07 ENCOUNTER — OFFICE VISIT (OUTPATIENT)
Dept: OBSTETRICS AND GYNECOLOGY | Facility: CLINIC | Age: 19
End: 2019-05-07
Payer: COMMERCIAL

## 2019-05-07 VITALS
WEIGHT: 142.75 LBS | SYSTOLIC BLOOD PRESSURE: 100 MMHG | DIASTOLIC BLOOD PRESSURE: 58 MMHG | BODY MASS INDEX: 23.75 KG/M2

## 2019-05-07 DIAGNOSIS — Z32.00 POSSIBLE PREGNANCY: Primary | ICD-10-CM

## 2019-05-07 DIAGNOSIS — Z34.81 ENCOUNTER FOR SUPERVISION OF OTHER NORMAL PREGNANCY IN FIRST TRIMESTER: ICD-10-CM

## 2019-05-07 DIAGNOSIS — Z36.89 SCREENING, ANTENATAL, FOR FETAL ANATOMIC SURVEY: ICD-10-CM

## 2019-05-07 DIAGNOSIS — Z36.9 ANTENATAL SCREENING ENCOUNTER: Primary | ICD-10-CM

## 2019-05-07 DIAGNOSIS — O20.9 VAGINAL BLEEDING AFFECTING EARLY PREGNANCY: ICD-10-CM

## 2019-05-07 LAB
B-HCG UR QL: POSITIVE
CTP QC/QA: YES

## 2019-05-07 PROCEDURE — 76805 OB US >/= 14 WKS SNGL FETUS: CPT | Mod: S$GLB,,, | Performed by: OBSTETRICS & GYNECOLOGY

## 2019-05-07 PROCEDURE — 99999 PR PBB SHADOW E&M-EST. PATIENT-LVL II: ICD-10-PCS | Mod: PBBFAC,,, | Performed by: ADVANCED PRACTICE MIDWIFE

## 2019-05-07 PROCEDURE — 0502F SUBSEQUENT PRENATAL CARE: CPT | Mod: S$GLB,,, | Performed by: ADVANCED PRACTICE MIDWIFE

## 2019-05-07 PROCEDURE — 99999 PR PBB SHADOW E&M-EST. PATIENT-LVL II: CPT | Mod: PBBFAC,,, | Performed by: ADVANCED PRACTICE MIDWIFE

## 2019-05-07 PROCEDURE — 99499 NO LOS: ICD-10-PCS | Mod: S$GLB,,, | Performed by: OBSTETRICS & GYNECOLOGY

## 2019-05-07 PROCEDURE — 76805 PR US, OB 14+WKS, TRANSABD, SINGLE GESTATION: ICD-10-PCS | Mod: S$GLB,,, | Performed by: OBSTETRICS & GYNECOLOGY

## 2019-05-07 PROCEDURE — 0502F PR SUBSEQUENT PRENATAL CARE: ICD-10-PCS | Mod: S$GLB,,, | Performed by: ADVANCED PRACTICE MIDWIFE

## 2019-05-07 PROCEDURE — 99499 UNLISTED E&M SERVICE: CPT | Mod: S$GLB,,, | Performed by: OBSTETRICS & GYNECOLOGY

## 2019-05-07 PROCEDURE — 81025 URINE PREGNANCY TEST: CPT | Mod: S$GLB,,, | Performed by: ADVANCED PRACTICE MIDWIFE

## 2019-05-07 PROCEDURE — 81025 POCT URINE PREGNANCY: ICD-10-PCS | Mod: S$GLB,,, | Performed by: ADVANCED PRACTICE MIDWIFE
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
## 2019-05-14 ENCOUNTER — ROUTINE PRENATAL (OUTPATIENT)
Dept: OBSTETRICS AND GYNECOLOGY | Facility: CLINIC | Age: 19
End: 2019-05-14
Payer: COMMERCIAL

## 2019-05-14 ENCOUNTER — LAB VISIT (OUTPATIENT)
Dept: LAB | Facility: OTHER | Age: 19
End: 2019-05-14
Payer: COMMERCIAL

## 2019-05-14 VITALS
WEIGHT: 140.13 LBS | BODY MASS INDEX: 23.31 KG/M2 | SYSTOLIC BLOOD PRESSURE: 110 MMHG | DIASTOLIC BLOOD PRESSURE: 68 MMHG

## 2019-05-14 DIAGNOSIS — Z3A.17 17 WEEKS GESTATION OF PREGNANCY: ICD-10-CM

## 2019-05-14 DIAGNOSIS — Z3A.17 17 WEEKS GESTATION OF PREGNANCY: Primary | ICD-10-CM

## 2019-05-14 LAB
ABO + RH BLD: NORMAL
BASOPHILS # BLD AUTO: 0.01 K/UL (ref 0–0.2)
BASOPHILS NFR BLD: 0.1 % (ref 0–1.9)
BLD GP AB SCN CELLS X3 SERPL QL: NORMAL
DIFFERENTIAL METHOD: NORMAL
EOSINOPHIL # BLD AUTO: 0.1 K/UL (ref 0–0.5)
EOSINOPHIL NFR BLD: 0.6 % (ref 0–8)
ERYTHROCYTE [DISTWIDTH] IN BLOOD BY AUTOMATED COUNT: 14.4 % (ref 11.5–14.5)
HCT VFR BLD AUTO: 38.4 % (ref 37–48.5)
HGB BLD-MCNC: 12.6 G/DL (ref 12–16)
LYMPHOCYTES # BLD AUTO: 2 K/UL (ref 1–4.8)
LYMPHOCYTES NFR BLD: 22.5 % (ref 18–48)
MCH RBC QN AUTO: 29.8 PG (ref 27–31)
MCHC RBC AUTO-ENTMCNC: 32.8 G/DL (ref 32–36)
MCV RBC AUTO: 91 FL (ref 82–98)
MONOCYTES # BLD AUTO: 0.6 K/UL (ref 0.3–1)
MONOCYTES NFR BLD: 7 % (ref 4–15)
NEUTROPHILS # BLD AUTO: 6.3 K/UL (ref 1.8–7.7)
NEUTROPHILS NFR BLD: 69.5 % (ref 38–73)
PLATELET # BLD AUTO: 282 K/UL (ref 150–350)
PMV BLD AUTO: 10.2 FL (ref 9.2–12.9)
RBC # BLD AUTO: 4.23 M/UL (ref 4–5.4)
WBC # BLD AUTO: 9.03 K/UL (ref 3.9–12.7)

## 2019-05-14 PROCEDURE — 86703 HIV-1/HIV-2 1 RESULT ANTBDY: CPT

## 2019-05-14 PROCEDURE — 83020 HEMOGLOBIN ELECTROPHORESIS: CPT

## 2019-05-14 PROCEDURE — 0502F SUBSEQUENT PRENATAL CARE: CPT | Mod: CPTII,S$GLB,, | Performed by: ADVANCED PRACTICE MIDWIFE

## 2019-05-14 PROCEDURE — 86901 BLOOD TYPING SEROLOGIC RH(D): CPT

## 2019-05-14 PROCEDURE — 99999 PR PBB SHADOW E&M-EST. PATIENT-LVL III: ICD-10-PCS | Mod: PBBFAC,,, | Performed by: ADVANCED PRACTICE MIDWIFE

## 2019-05-14 PROCEDURE — 81511 FTL CGEN ABNOR FOUR ANAL: CPT

## 2019-05-14 PROCEDURE — 85025 COMPLETE CBC W/AUTO DIFF WBC: CPT

## 2019-05-14 PROCEDURE — 86592 SYPHILIS TEST NON-TREP QUAL: CPT

## 2019-05-14 PROCEDURE — 99999 PR PBB SHADOW E&M-EST. PATIENT-LVL III: CPT | Mod: PBBFAC,,, | Performed by: ADVANCED PRACTICE MIDWIFE

## 2019-05-14 PROCEDURE — 0502F PR SUBSEQUENT PRENATAL CARE: ICD-10-PCS | Mod: CPTII,S$GLB,, | Performed by: ADVANCED PRACTICE MIDWIFE

## 2019-05-14 PROCEDURE — 87340 HEPATITIS B SURFACE AG IA: CPT

## 2019-05-14 PROCEDURE — 36415 COLL VENOUS BLD VENIPUNCTURE: CPT

## 2019-05-14 PROCEDURE — 86762 RUBELLA ANTIBODY: CPT

## 2019-05-14 RX ORDER — CEPHALEXIN 500 MG/1
500 CAPSULE ORAL 2 TIMES DAILY
Refills: 0 | COMMUNITY
Start: 2019-04-05 | End: 2019-05-14 | Stop reason: ALTCHOICE

## 2019-05-14 RX ORDER — B-COMPLEX WITH VITAMIN C
1 TABLET ORAL DAILY
Refills: 0 | COMMUNITY
Start: 2019-04-05 | End: 2019-06-11 | Stop reason: SDUPTHER

## 2019-05-15 ENCOUNTER — DOCUMENTATION ONLY (OUTPATIENT)
Dept: OBSTETRICS AND GYNECOLOGY | Facility: CLINIC | Age: 19
End: 2019-05-15

## 2019-05-15 ENCOUNTER — TELEPHONE (OUTPATIENT)
Dept: OBSTETRICS AND GYNECOLOGY | Facility: CLINIC | Age: 19
End: 2019-05-15

## 2019-05-15 DIAGNOSIS — Z91.51 HISTORY OF SUICIDE ATTEMPT: Primary | ICD-10-CM

## 2019-05-15 DIAGNOSIS — Z34.92 PREGNANCY WITH ONE FETUS IN SECOND TRIMESTER: ICD-10-CM

## 2019-05-15 DIAGNOSIS — F41.9 ANXIETY: ICD-10-CM

## 2019-05-15 LAB
HBV SURFACE AG SERPL QL IA: NEGATIVE
HGB A2 MFR BLD HPLC: 2.9 % (ref 2.2–3.2)
HGB FRACT BLD ELPH-IMP: NORMAL
HGB FRACT BLD ELPH-IMP: NORMAL
HIV 1+2 AB+HIV1 P24 AG SERPL QL IA: NEGATIVE
RPR SER QL: NORMAL
RUBV IGG SER-ACNC: 79.3 IU/ML
RUBV IGG SER-IMP: REACTIVE

## 2019-05-15 NOTE — TELEPHONE ENCOUNTER
Received outside prenatal records and called pt regarding follow up of care - providers please see documentation note.
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
## 2019-05-16 LAB
# FETUSES US: NORMAL
2ND TRIMESTER 4 SCREEN PNL SERPL: NEGATIVE
2ND TRIMESTER 4 SCREEN SERPL-IMP: NORMAL
AFP MOM SERPL: 0.93
AFP SERPL-MCNC: 46.3 NG/ML
AGE AT DELIVERY: 21
B-HCG MOM SERPL: 0.57
B-HCG SERPL-ACNC: 17.7 IU/ML
FET TS 21 RISK FROM MAT AGE: NORMAL
GA (DAYS): 3 D
GA (WEEKS): 17 WK
GA METHOD: NORMAL
IDDM PATIENT QL: NORMAL
INHIBIN A MOM SERPL: 1.42
INHIBIN A SERPL-MCNC: 230.9 PG/ML
SMOKING STATUS FTND: NO
TS 18 RISK FETUS: NORMAL
TS 21 RISK FETUS: NORMAL
U ESTRIOL MOM SERPL: 1.02
U ESTRIOL SERPL-MCNC: 1.2 NG/ML

## 2019-05-17 ENCOUNTER — TELEPHONE (OUTPATIENT)
Dept: PSYCHIATRY | Facility: CLINIC | Age: 19
End: 2019-05-17

## 2019-05-23 ENCOUNTER — TELEPHONE (OUTPATIENT)
Dept: PSYCHIATRY | Facility: CLINIC | Age: 19
End: 2019-05-23

## 2019-05-31 ENCOUNTER — DOCUMENTATION ONLY (OUTPATIENT)
Dept: OBSTETRICS AND GYNECOLOGY | Facility: CLINIC | Age: 19
End: 2019-05-31
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
## 2019-06-11 ENCOUNTER — PROCEDURE VISIT (OUTPATIENT)
Dept: MATERNAL FETAL MEDICINE | Facility: CLINIC | Age: 19
End: 2019-06-11
Payer: COMMERCIAL

## 2019-06-11 ENCOUNTER — ROUTINE PRENATAL (OUTPATIENT)
Dept: OBSTETRICS AND GYNECOLOGY | Facility: CLINIC | Age: 19
End: 2019-06-11
Payer: COMMERCIAL

## 2019-06-11 VITALS
WEIGHT: 145.81 LBS | DIASTOLIC BLOOD PRESSURE: 62 MMHG | BODY MASS INDEX: 24.27 KG/M2 | SYSTOLIC BLOOD PRESSURE: 110 MMHG

## 2019-06-11 DIAGNOSIS — Z36.89 SCREENING, ANTENATAL, FOR FETAL ANATOMIC SURVEY: ICD-10-CM

## 2019-06-11 DIAGNOSIS — Z34.82 ENCOUNTER FOR SUPERVISION OF OTHER NORMAL PREGNANCY IN SECOND TRIMESTER: Primary | ICD-10-CM

## 2019-06-11 DIAGNOSIS — Z91.51 HISTORY OF SUICIDE ATTEMPT: ICD-10-CM

## 2019-06-11 PROCEDURE — 99999 PR PBB SHADOW E&M-EST. PATIENT-LVL II: CPT | Mod: PBBFAC,,, | Performed by: ADVANCED PRACTICE MIDWIFE

## 2019-06-11 PROCEDURE — 0502F PR SUBSEQUENT PRENATAL CARE: ICD-10-PCS | Mod: CPTII,S$GLB,, | Performed by: ADVANCED PRACTICE MIDWIFE

## 2019-06-11 PROCEDURE — 99499 NO LOS: ICD-10-PCS | Mod: S$GLB,,, | Performed by: OBSTETRICS & GYNECOLOGY

## 2019-06-11 PROCEDURE — 76805 PR US, OB 14+WKS, TRANSABD, SINGLE GESTATION: ICD-10-PCS | Mod: S$GLB,,, | Performed by: OBSTETRICS & GYNECOLOGY

## 2019-06-11 PROCEDURE — 99499 UNLISTED E&M SERVICE: CPT | Mod: S$GLB,,, | Performed by: OBSTETRICS & GYNECOLOGY

## 2019-06-11 PROCEDURE — 0502F SUBSEQUENT PRENATAL CARE: CPT | Mod: CPTII,S$GLB,, | Performed by: ADVANCED PRACTICE MIDWIFE

## 2019-06-11 PROCEDURE — 99999 PR PBB SHADOW E&M-EST. PATIENT-LVL II: ICD-10-PCS | Mod: PBBFAC,,, | Performed by: ADVANCED PRACTICE MIDWIFE

## 2019-06-11 PROCEDURE — 76805 OB US >/= 14 WKS SNGL FETUS: CPT | Mod: S$GLB,,, | Performed by: OBSTETRICS & GYNECOLOGY
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
## 2019-06-13 DIAGNOSIS — Z36.89 ENCOUNTER FOR ULTRASOUND TO CHECK FETAL GROWTH: Primary | ICD-10-CM

## 2019-06-18 PROBLEM — O43.199 MARGINAL INSERTION OF UMBILICAL CORD AFFECTING MANAGEMENT OF MOTHER: Status: ACTIVE | Noted: 2019-06-18

## 2019-06-25 ENCOUNTER — ROUTINE PRENATAL (OUTPATIENT)
Dept: OBSTETRICS AND GYNECOLOGY | Facility: CLINIC | Age: 19
End: 2019-06-25
Payer: COMMERCIAL

## 2019-06-25 VITALS
DIASTOLIC BLOOD PRESSURE: 70 MMHG | WEIGHT: 149.69 LBS | BODY MASS INDEX: 24.91 KG/M2 | SYSTOLIC BLOOD PRESSURE: 110 MMHG

## 2019-06-25 DIAGNOSIS — Z91.51 HISTORY OF SUICIDE ATTEMPT: ICD-10-CM

## 2019-06-25 DIAGNOSIS — Z3A.25 25 WEEKS GESTATION OF PREGNANCY: Primary | ICD-10-CM

## 2019-06-25 PROCEDURE — 0502F SUBSEQUENT PRENATAL CARE: CPT | Mod: CPTII,S$GLB,, | Performed by: ADVANCED PRACTICE MIDWIFE

## 2019-06-25 PROCEDURE — 99999 PR PBB SHADOW E&M-EST. PATIENT-LVL II: ICD-10-PCS | Mod: PBBFAC,,, | Performed by: ADVANCED PRACTICE MIDWIFE

## 2019-06-25 PROCEDURE — 99999 PR PBB SHADOW E&M-EST. PATIENT-LVL II: CPT | Mod: PBBFAC,,, | Performed by: ADVANCED PRACTICE MIDWIFE

## 2019-06-25 PROCEDURE — 0502F PR SUBSEQUENT PRENATAL CARE: ICD-10-PCS | Mod: CPTII,S$GLB,, | Performed by: ADVANCED PRACTICE MIDWIFE

## 2019-06-26 NOTE — PROGRESS NOTES
19 y.o. female  at 23w2d   Reports + FM, denies VB, LOF, or cramping  Doing well without concerns , reports feeling well, reports has scheduled follow up appointment with psych in July  Reviewed upcoming 28wk labs, (O POS) and orders placed  Reviewed warning signs, normal FM,  labor precautions and how/when to call.  RTC x 4 wks, call or present sooner prn.

## 2019-07-09 ENCOUNTER — LAB VISIT (OUTPATIENT)
Dept: LAB | Facility: OTHER | Age: 19
End: 2019-07-09
Payer: COMMERCIAL

## 2019-07-09 ENCOUNTER — ROUTINE PRENATAL (OUTPATIENT)
Dept: OBSTETRICS AND GYNECOLOGY | Facility: CLINIC | Age: 19
End: 2019-07-09
Payer: COMMERCIAL

## 2019-07-09 VITALS
WEIGHT: 148.81 LBS | DIASTOLIC BLOOD PRESSURE: 66 MMHG | SYSTOLIC BLOOD PRESSURE: 110 MMHG | BODY MASS INDEX: 24.76 KG/M2

## 2019-07-09 DIAGNOSIS — Z3A.25 25 WEEKS GESTATION OF PREGNANCY: Primary | ICD-10-CM

## 2019-07-09 DIAGNOSIS — Z3A.25 25 WEEKS GESTATION OF PREGNANCY: ICD-10-CM

## 2019-07-09 LAB
BASOPHILS # BLD AUTO: 0.01 K/UL (ref 0–0.2)
BASOPHILS NFR BLD: 0.1 % (ref 0–1.9)
DIFFERENTIAL METHOD: ABNORMAL
EOSINOPHIL # BLD AUTO: 0 K/UL (ref 0–0.5)
EOSINOPHIL NFR BLD: 0.1 % (ref 0–8)
ERYTHROCYTE [DISTWIDTH] IN BLOOD BY AUTOMATED COUNT: 14.1 % (ref 11.5–14.5)
GLUCOSE SERPL-MCNC: 85 MG/DL (ref 70–140)
HCT VFR BLD AUTO: 37.7 % (ref 37–48.5)
HGB BLD-MCNC: 12.6 G/DL (ref 12–16)
LYMPHOCYTES # BLD AUTO: 4.1 K/UL (ref 1–4.8)
LYMPHOCYTES NFR BLD: 27.4 % (ref 18–48)
MCH RBC QN AUTO: 30.6 PG (ref 27–31)
MCHC RBC AUTO-ENTMCNC: 33.4 G/DL (ref 32–36)
MCV RBC AUTO: 92 FL (ref 82–98)
MONOCYTES # BLD AUTO: 0.9 K/UL (ref 0.3–1)
MONOCYTES NFR BLD: 6.3 % (ref 4–15)
NEUTROPHILS # BLD AUTO: 9.7 K/UL (ref 1.8–7.7)
NEUTROPHILS NFR BLD: 66.1 % (ref 38–73)
PLATELET # BLD AUTO: 264 K/UL (ref 150–350)
PMV BLD AUTO: 10.3 FL (ref 9.2–12.9)
RBC # BLD AUTO: 4.12 M/UL (ref 4–5.4)
WBC # BLD AUTO: 14.87 K/UL (ref 3.9–12.7)

## 2019-07-09 PROCEDURE — 0502F SUBSEQUENT PRENATAL CARE: CPT | Mod: CPTII,S$GLB,, | Performed by: ADVANCED PRACTICE MIDWIFE

## 2019-07-09 PROCEDURE — 99999 PR PBB SHADOW E&M-EST. PATIENT-LVL II: CPT | Mod: PBBFAC,,, | Performed by: ADVANCED PRACTICE MIDWIFE

## 2019-07-09 PROCEDURE — 36415 COLL VENOUS BLD VENIPUNCTURE: CPT

## 2019-07-09 PROCEDURE — 85025 COMPLETE CBC W/AUTO DIFF WBC: CPT

## 2019-07-09 PROCEDURE — 99999 PR PBB SHADOW E&M-EST. PATIENT-LVL II: ICD-10-PCS | Mod: PBBFAC,,, | Performed by: ADVANCED PRACTICE MIDWIFE

## 2019-07-09 PROCEDURE — 0502F PR SUBSEQUENT PRENATAL CARE: ICD-10-PCS | Mod: CPTII,S$GLB,, | Performed by: ADVANCED PRACTICE MIDWIFE

## 2019-07-09 PROCEDURE — 82950 GLUCOSE TEST: CPT

## 2019-07-09 NOTE — PROGRESS NOTES
19 y.o. female  at 25w1d   Reports + FM, denies VB, LOF, or cramping  Doing well without concerns   Reviewed upcoming 28wk labs, (O POS) and orders placed, tdap handout provided and explained  Reviewed warning signs, normal FM,  labor precautions and how/when to call.  Pt reports has psych appt on 19  Advised [facility] does not do Romi birth  RTC x 3 wks, call or present sooner prn.

## 2019-07-15 ENCOUNTER — TELEPHONE (OUTPATIENT)
Dept: MATERNAL FETAL MEDICINE | Facility: CLINIC | Age: 19
End: 2019-07-15

## 2019-07-15 NOTE — TELEPHONE ENCOUNTER
This is the first attempt to reach Ms. Quach about scheduling a follow up scan. A message was left for the patient to call the office back.

## 2019-07-24 ENCOUNTER — OFFICE VISIT (OUTPATIENT)
Dept: PSYCHIATRY | Facility: CLINIC | Age: 19
End: 2019-07-24
Payer: COMMERCIAL

## 2019-07-24 VITALS
HEART RATE: 84 BPM | WEIGHT: 149.06 LBS | SYSTOLIC BLOOD PRESSURE: 102 MMHG | DIASTOLIC BLOOD PRESSURE: 55 MMHG | HEIGHT: 65 IN | BODY MASS INDEX: 24.83 KG/M2

## 2019-07-24 DIAGNOSIS — Z91.51 HISTORY OF SUICIDE ATTEMPT: Primary | ICD-10-CM

## 2019-07-24 PROCEDURE — 99203 OFFICE O/P NEW LOW 30 MIN: CPT | Mod: S$GLB,,, | Performed by: STUDENT IN AN ORGANIZED HEALTH CARE EDUCATION/TRAINING PROGRAM

## 2019-07-24 PROCEDURE — 99203 PR OFFICE/OUTPT VISIT, NEW, LEVL III, 30-44 MIN: ICD-10-PCS | Mod: S$GLB,,, | Performed by: STUDENT IN AN ORGANIZED HEALTH CARE EDUCATION/TRAINING PROGRAM

## 2019-07-24 PROCEDURE — 3008F BODY MASS INDEX DOCD: CPT | Mod: CPTII,S$GLB,, | Performed by: STUDENT IN AN ORGANIZED HEALTH CARE EDUCATION/TRAINING PROGRAM

## 2019-07-24 PROCEDURE — 99999 PR PBB SHADOW E&M-EST. PATIENT-LVL II: ICD-10-PCS | Mod: PBBFAC,,, | Performed by: STUDENT IN AN ORGANIZED HEALTH CARE EDUCATION/TRAINING PROGRAM

## 2019-07-24 PROCEDURE — 99999 PR PBB SHADOW E&M-EST. PATIENT-LVL II: CPT | Mod: PBBFAC,,, | Performed by: STUDENT IN AN ORGANIZED HEALTH CARE EDUCATION/TRAINING PROGRAM

## 2019-07-24 PROCEDURE — 3008F PR BODY MASS INDEX (BMI) DOCUMENTED: ICD-10-PCS | Mod: CPTII,S$GLB,, | Performed by: STUDENT IN AN ORGANIZED HEALTH CARE EDUCATION/TRAINING PROGRAM
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
## 2019-07-25 NOTE — PROGRESS NOTES
Pt was presented and discussed at length.   Pt left the clinic before she could be interviewed independently.  She was breferred  By OBGYN before water birth.  Pt has significant past psychiatric history.  Agree with plan to follow throughout pregnancy.

## 2019-07-30 ENCOUNTER — ROUTINE PRENATAL (OUTPATIENT)
Dept: OBSTETRICS AND GYNECOLOGY | Facility: CLINIC | Age: 19
End: 2019-07-30
Payer: COMMERCIAL

## 2019-07-30 VITALS — WEIGHT: 146.5 LBS | SYSTOLIC BLOOD PRESSURE: 104 MMHG | BODY MASS INDEX: 24.38 KG/M2 | DIASTOLIC BLOOD PRESSURE: 72 MMHG

## 2019-07-30 DIAGNOSIS — Z34.83 ENCOUNTER FOR SUPERVISION OF OTHER NORMAL PREGNANCY IN THIRD TRIMESTER: ICD-10-CM

## 2019-07-30 DIAGNOSIS — Z34.03 ENCOUNTER FOR SUPERVISION OF NORMAL FIRST PREGNANCY IN THIRD TRIMESTER: Primary | ICD-10-CM

## 2019-07-30 PROCEDURE — 99999 PR PBB SHADOW E&M-EST. PATIENT-LVL III: ICD-10-PCS | Mod: PBBFAC,,, | Performed by: ADVANCED PRACTICE MIDWIFE

## 2019-07-30 PROCEDURE — 0502F SUBSEQUENT PRENATAL CARE: CPT | Mod: CPTII,S$GLB,, | Performed by: ADVANCED PRACTICE MIDWIFE

## 2019-07-30 PROCEDURE — 0502F PR SUBSEQUENT PRENATAL CARE: ICD-10-PCS | Mod: CPTII,S$GLB,, | Performed by: ADVANCED PRACTICE MIDWIFE

## 2019-07-30 PROCEDURE — 99999 PR PBB SHADOW E&M-EST. PATIENT-LVL III: CPT | Mod: PBBFAC,,, | Performed by: ADVANCED PRACTICE MIDWIFE

## 2019-07-30 NOTE — PROGRESS NOTES
19 y.o. female  at 28w1d   Reports + FM, denies VB, LOF or CTX  Doing well without concerns  Tdap- declines all vaccines  Reviewed warning signs, normal FKCs,  labor precautions and how/when to call.  RTC x 2 wks, call or present sooner prn.

## 2019-08-12 ENCOUNTER — DOCUMENTATION ONLY (OUTPATIENT)
Dept: OBSTETRICS AND GYNECOLOGY | Facility: CLINIC | Age: 19
End: 2019-08-12

## 2019-08-12 NOTE — PROGRESS NOTES
Date: 8/12/19    Reviewed patient medical and obstetrical history with Dr. Ronquillo.     Patients History is significant for Psych evaluation       Dr. Ronquillo recommendations for patient:  Pt to deliver on L&D Dr. Ronquillo request psych evaluation notes to determine eligibility for water birth  (will request at next visit)

## 2019-08-13 ENCOUNTER — PROCEDURE VISIT (OUTPATIENT)
Dept: MATERNAL FETAL MEDICINE | Facility: CLINIC | Age: 19
End: 2019-08-13
Payer: COMMERCIAL

## 2019-08-13 ENCOUNTER — ROUTINE PRENATAL (OUTPATIENT)
Dept: OBSTETRICS AND GYNECOLOGY | Facility: CLINIC | Age: 19
End: 2019-08-13
Payer: COMMERCIAL

## 2019-08-13 VITALS
DIASTOLIC BLOOD PRESSURE: 56 MMHG | SYSTOLIC BLOOD PRESSURE: 100 MMHG | BODY MASS INDEX: 25.06 KG/M2 | WEIGHT: 150.56 LBS

## 2019-08-13 DIAGNOSIS — Z34.93 PREGNANCY WITH ONE FETUS IN THIRD TRIMESTER: Primary | ICD-10-CM

## 2019-08-13 DIAGNOSIS — Z91.419 H/O ADULT VICTIM OF ABUSE: ICD-10-CM

## 2019-08-13 DIAGNOSIS — Z36.89 ENCOUNTER FOR ULTRASOUND TO CHECK FETAL GROWTH: ICD-10-CM

## 2019-08-13 PROCEDURE — 99499 NO LOS: ICD-10-PCS | Mod: S$GLB,,, | Performed by: PEDIATRICS

## 2019-08-13 PROCEDURE — 0502F SUBSEQUENT PRENATAL CARE: CPT | Mod: CPTII,S$GLB,, | Performed by: ADVANCED PRACTICE MIDWIFE

## 2019-08-13 PROCEDURE — 99999 PR PBB SHADOW E&M-EST. PATIENT-LVL II: ICD-10-PCS | Mod: PBBFAC,,, | Performed by: ADVANCED PRACTICE MIDWIFE

## 2019-08-13 PROCEDURE — 76816 PR  US,PREGNANT UTERUS,F/U,TRANSABD APP: ICD-10-PCS | Mod: S$GLB,,, | Performed by: PEDIATRICS

## 2019-08-13 PROCEDURE — 99999 PR PBB SHADOW E&M-EST. PATIENT-LVL II: CPT | Mod: PBBFAC,,, | Performed by: ADVANCED PRACTICE MIDWIFE

## 2019-08-13 PROCEDURE — 0502F PR SUBSEQUENT PRENATAL CARE: ICD-10-PCS | Mod: CPTII,S$GLB,, | Performed by: ADVANCED PRACTICE MIDWIFE

## 2019-08-13 PROCEDURE — 76816 OB US FOLLOW-UP PER FETUS: CPT | Mod: S$GLB,,, | Performed by: PEDIATRICS

## 2019-08-13 PROCEDURE — 99499 UNLISTED E&M SERVICE: CPT | Mod: S$GLB,,, | Performed by: PEDIATRICS

## 2019-08-13 NOTE — PROGRESS NOTES
19 y.o. female  at 30w1d   Reports + FM, denies VB, LOF or CTX  Doing well without concerns   Darell here  Had apt. With psych on 19, next 19, no meds at this point  Anabelle Keane here  Talked briefly with client alone, FOB &  outside door.  Some BH  Had light spotting post coidal  TWG: ? lbs   28wk labs wnl (O POS)   Does not want Tdap  Reviewed warning signs, normal FKCs,  labor precautions and how/when to call.  RTC x 2 wks, call or present sooner prn.     Birth Center Risk Assessment: 1- CNM  Management on L&D  Per MD, CNM meeting    0- CNM management in ABC  1- CNM management on L&D  2- Consultation with OB to develop  plan of care  3- Collaborative CNM/OB management with delivery on L&D  4- Permanent referral of care to MD

## 2019-08-22 ENCOUNTER — OFFICE VISIT (OUTPATIENT)
Dept: PSYCHIATRY | Facility: CLINIC | Age: 19
End: 2019-08-22
Payer: COMMERCIAL

## 2019-08-22 DIAGNOSIS — F32.A DEPRESSION DURING PREGNANCY IN THIRD TRIMESTER: ICD-10-CM

## 2019-08-22 DIAGNOSIS — Z91.51 HISTORY OF SUICIDE ATTEMPT: Primary | ICD-10-CM

## 2019-08-22 DIAGNOSIS — O99.343 DEPRESSION DURING PREGNANCY IN THIRD TRIMESTER: ICD-10-CM

## 2019-08-22 PROCEDURE — 90791 PSYCH DIAGNOSTIC EVALUATION: CPT | Mod: S$GLB,,, | Performed by: SOCIAL WORKER

## 2019-08-22 PROCEDURE — 90791 PR PSYCHIATRIC DIAGNOSTIC EVALUATION: ICD-10-PCS | Mod: S$GLB,,, | Performed by: SOCIAL WORKER
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
## 2019-08-27 ENCOUNTER — ROUTINE PRENATAL (OUTPATIENT)
Dept: OBSTETRICS AND GYNECOLOGY | Facility: CLINIC | Age: 19
End: 2019-08-27
Payer: COMMERCIAL

## 2019-08-27 VITALS
WEIGHT: 149.94 LBS | SYSTOLIC BLOOD PRESSURE: 102 MMHG | DIASTOLIC BLOOD PRESSURE: 58 MMHG | BODY MASS INDEX: 24.95 KG/M2

## 2019-08-27 DIAGNOSIS — Z34.83 ENCOUNTER FOR SUPERVISION OF OTHER NORMAL PREGNANCY IN THIRD TRIMESTER: Primary | ICD-10-CM

## 2019-08-27 PROCEDURE — 0502F PR SUBSEQUENT PRENATAL CARE: ICD-10-PCS | Mod: CPTII,S$GLB,, | Performed by: ADVANCED PRACTICE MIDWIFE

## 2019-08-27 PROCEDURE — 99999 PR PBB SHADOW E&M-EST. PATIENT-LVL II: ICD-10-PCS | Mod: PBBFAC,,, | Performed by: ADVANCED PRACTICE MIDWIFE

## 2019-08-27 PROCEDURE — 0502F SUBSEQUENT PRENATAL CARE: CPT | Mod: CPTII,S$GLB,, | Performed by: ADVANCED PRACTICE MIDWIFE

## 2019-08-27 PROCEDURE — 99999 PR PBB SHADOW E&M-EST. PATIENT-LVL II: CPT | Mod: PBBFAC,,, | Performed by: ADVANCED PRACTICE MIDWIFE

## 2019-08-27 NOTE — PROGRESS NOTES
19 y.o. female  at 32w1d   Reports + FM, denies VB, LOF or CTX  Doing well without concerns    Tdap- declines and decline all vaccines for infant  Reviewed warning signs, normal FKCs,  labor precautions and how/when to call.  RTC x 2 wks, call or present sooner prn.

## 2019-08-30 ENCOUNTER — TELEPHONE (OUTPATIENT)
Dept: OBSTETRICS AND GYNECOLOGY | Facility: CLINIC | Age: 19
End: 2019-08-30

## 2019-08-30 DIAGNOSIS — Z3A.32 32 WEEKS GESTATION OF PREGNANCY: Primary | ICD-10-CM

## 2019-09-04 ENCOUNTER — TELEPHONE (OUTPATIENT)
Dept: OBSTETRICS AND GYNECOLOGY | Facility: CLINIC | Age: 19
End: 2019-09-04

## 2019-09-04 NOTE — TELEPHONE ENCOUNTER
Called pt regarding marginal cord insertion and possible iugr restricting a waterbirth  She is scheduled for a repeat U/S and I told her we would have to reevaluate the situation for water when we can.  She says she would still love to have water but I stressed that it is not possible at this time.

## 2019-09-12 ENCOUNTER — OFFICE VISIT (OUTPATIENT)
Dept: MATERNAL FETAL MEDICINE | Facility: CLINIC | Age: 19
End: 2019-09-12
Payer: COMMERCIAL

## 2019-09-12 ENCOUNTER — ROUTINE PRENATAL (OUTPATIENT)
Dept: OBSTETRICS AND GYNECOLOGY | Facility: CLINIC | Age: 19
End: 2019-09-12
Payer: COMMERCIAL

## 2019-09-12 ENCOUNTER — PROCEDURE VISIT (OUTPATIENT)
Dept: MATERNAL FETAL MEDICINE | Facility: CLINIC | Age: 19
End: 2019-09-12
Payer: COMMERCIAL

## 2019-09-12 VITALS
WEIGHT: 153.31 LBS | DIASTOLIC BLOOD PRESSURE: 78 MMHG | SYSTOLIC BLOOD PRESSURE: 124 MMHG | BODY MASS INDEX: 25.52 KG/M2

## 2019-09-12 DIAGNOSIS — O36.5910 POOR FETAL GROWTH AFFECTING MANAGEMENT OF MOTHER IN FIRST TRIMESTER, SINGLE OR UNSPECIFIED FETUS: Primary | ICD-10-CM

## 2019-09-12 DIAGNOSIS — Z36.89 ENCOUNTER FOR ULTRASOUND TO CHECK FETAL GROWTH: ICD-10-CM

## 2019-09-12 DIAGNOSIS — Z34.93 PREGNANCY WITH ONE FETUS IN THIRD TRIMESTER: Primary | ICD-10-CM

## 2019-09-12 DIAGNOSIS — Z3A.32 32 WEEKS GESTATION OF PREGNANCY: ICD-10-CM

## 2019-09-12 PROCEDURE — 99499 UNLISTED E&M SERVICE: CPT | Mod: S$GLB,,, | Performed by: ADVANCED PRACTICE MIDWIFE

## 2019-09-12 PROCEDURE — 76820 PR US, OB DOPPLER, FETAL UMBILICAL ARTERY ECHO: ICD-10-PCS | Mod: S$GLB,,, | Performed by: OBSTETRICS & GYNECOLOGY

## 2019-09-12 PROCEDURE — 76816 PR  US,PREGNANT UTERUS,F/U,TRANSABD APP: ICD-10-PCS | Mod: S$GLB,,, | Performed by: OBSTETRICS & GYNECOLOGY

## 2019-09-12 PROCEDURE — 99499 NO LOS: ICD-10-PCS | Mod: S$GLB,,, | Performed by: ADVANCED PRACTICE MIDWIFE

## 2019-09-12 PROCEDURE — 99213 OFFICE O/P EST LOW 20 MIN: CPT | Mod: 25,S$GLB,, | Performed by: OBSTETRICS & GYNECOLOGY

## 2019-09-12 PROCEDURE — 99999 PR PBB SHADOW E&M-EST. PATIENT-LVL II: CPT | Mod: PBBFAC,,, | Performed by: ADVANCED PRACTICE MIDWIFE

## 2019-09-12 PROCEDURE — 99999 PR PBB SHADOW E&M-EST. PATIENT-LVL II: ICD-10-PCS | Mod: PBBFAC,,, | Performed by: ADVANCED PRACTICE MIDWIFE

## 2019-09-12 PROCEDURE — 76819 PR US, OB, FETAL BIOPHYSICAL, W/O NST: ICD-10-PCS | Mod: S$GLB,,, | Performed by: OBSTETRICS & GYNECOLOGY

## 2019-09-12 PROCEDURE — 99213 PR OFFICE/OUTPT VISIT, EST, LEVL III, 20-29 MIN: ICD-10-PCS | Mod: 25,S$GLB,, | Performed by: OBSTETRICS & GYNECOLOGY

## 2019-09-12 PROCEDURE — 76820 UMBILICAL ARTERY ECHO: CPT | Mod: S$GLB,,, | Performed by: OBSTETRICS & GYNECOLOGY

## 2019-09-12 PROCEDURE — 76819 FETAL BIOPHYS PROFIL W/O NST: CPT | Mod: S$GLB,,, | Performed by: OBSTETRICS & GYNECOLOGY

## 2019-09-12 PROCEDURE — 76816 OB US FOLLOW-UP PER FETUS: CPT | Mod: S$GLB,,, | Performed by: OBSTETRICS & GYNECOLOGY

## 2019-09-12 NOTE — PROGRESS NOTES
OB Ultrasound Report and consultation note  (see PDF version under imaging tab)    Indication  ========    Follow-up evaluation for fetal growth, marginal cord insert    History  ======    Previous Outcomes   2  Para 0  Abortions (A) 1  Miscarriages 1  Preg. no. 1  Outcome: miscarriage  Details: 7 weeks 2019    Pregnancy History  ===============    Maternal Lab Tests  Test: quad screen  Result:    NEGATIVE  DSR 1:9300    Fetal Growth Overview  =================    Exam date         GA              BPD (mm)         HC (mm)        AC (mm)        FL (mm)         HL (mm)        EFW (g)  2019          16w 4d        35.8                 130.0              102.7             20.9              20.3              152  2019        21w 4d         50.9                 184.5             162.4             35.9              35.1              413     28%  2019        30w 4d        74.9                  265.7             245.5             52.8                                   1,262    13%  2019        34w 6d        81.6                  295.9             284.2             60.0              54.5              1,911    5%      Method  ======    Transabdominal ultrasound examination. 2D Color Doppler, [place]on E10. View: Good view    Pregnancy  =========    Fry pregnancy. Number of fetuses: 1    Dating  ======    Cycle: regular cycle,  Ultrasound examination on: 2019  GA by U/S based upon: AC, BPD, Femur, HC  GA by U/S 32 w + 2 d  DARIA by U/S: 2019  Assigned: Dating performed on 2019, based on ultrasound (AC, BPD, Femur, HC)  Assigned GA 34 w + 6 d  Assigned DARIA: 10/18/2019  Pregnancy length 280 d    General Evaluation  ===============    Cardiac activity present.  bpm.  Fetal movements visualized.  Presentation cephalic.  Placenta posterior.  Umbilical cord 3 vessel cord.  Amniotic fluid Amount of AF: normal amount. MVP 5.9 cm.    Biophysical Profile  ===============    2: Fetal  breathing movements  2: Gross body movements  2: Fetal tone  2: Amniotic fluid volume  8/8 Biophysical profile score    Fetal Biometry  ===========    Standard  BPD 81.6 mm  32w 6d                Hadlock    .3 mm  33w 1d                Kobe    .9 mm  32w 5d                Hadlock    .2 mm  32w 3d                Hadlock    Femur 60.0 mm  31w 2d                Hadlock    Humerus 54.5 mm  31w 5d                Kobe    HC / AC 1.04    EFW 1,911 g          5%        Morales    EFW (lb) 4 lb  EFW (oz) 3 oz  EFW by: Hadlock (BPD-HC-AC-FL)  Extended  Tibia 52.2 mm  31w 0d                Kobe    Fibula 49.8 mm  30w 3d                Kobe    Radius 46.2 mm    Ulna 52.4 mm  33w 1d                Kobe    Head / Face / Neck  Cephalic index 0.80    Extremities / Bony Struc  FL / BPD 0.74    FL / AC 0.21    Other Structures   bpm    Fetal Anatomy  ===========    Cranium: normal  Stomach: normal  Kidneys: normal  Bladder: normal  Genitals: documented previously  Gender: male  Wants to know gender: no    Fetal Doppler  ===========    Arterial  Umbilical A S / D 3.18          85%        Keith      Consultation  ==========    Type: IUGR  I spent 15 minutes on the consultation today with the patient and her partner Darell regarding findings from today's ultrasound exam.  More than 50% of the consultation was spent in face-to-face counseling and coordination of care.    Referring practitioner: ALE Marte CNM    Indication for consult: IUGR    On today's ultrasound exam, IUGR is diagnosed with an EFW which plots at the 5th percentile. No fetal structural abnormalities are  identified, and the amniotic fluid volume is normal. A biophysical profile was performed, and the score was reassuring at 8/8. Umbilical  artery Doppler studies were also performed, and the S/D ratio is normal. Additionally, both the patient and her partner are of relatively  small stature. Given these findings, intrinsic fetal  abnormalities, such as aneuploidy or congenital infection, are felt to be an unlikely  cause of the IUGR. No maternal risk factors for fetal IUGR are identified today.    Follow-up surveillance in light of the diagnosis of IUGR is as follows:  1. Weekly umbilical artery Doppler studies  2. Twice weekly antepartum testing for fetal well-being with NST/NICK or BPP  3. Reassessment of fetal growth in 3 weeks    Recommendations regarding timing of delivery will be made after the f/u growth exam in 3 weeks.    Impression  =========    1. IUP - 34 and 6/7 weeks  2. IUGR diagnosed today with the EFW plotting at the 5th%  3. Reassuring BPP score, normal UA doppler S/D ratios, and normal AFV    Recommendation  ==============    see consultation section for recommendations

## 2019-09-12 NOTE — PROGRESS NOTES
Client here waiting for apt. With FOB.   In tears when came in clinic  Came from U/S  FOB was in and out of room  Left and went home, canceled and will rescheduled  Called client, she states she is ok and that she was with FOB on speaker phone  Will call to reschedule apt.   ** See U/S  Results  Not seen by provider today

## 2019-09-14 PROBLEM — O20.9 VAGINAL BLEEDING AFFECTING EARLY PREGNANCY: Status: RESOLVED | Noted: 2019-05-07 | Resolved: 2019-09-14

## 2019-09-14 PROBLEM — O36.5930 POOR FETAL GROWTH AFFECTING MANAGEMENT OF MOTHER IN THIRD TRIMESTER: Status: ACTIVE | Noted: 2019-09-14

## 2019-09-20 ENCOUNTER — HOSPITAL ENCOUNTER (OUTPATIENT)
Dept: PERINATAL CARE | Facility: OTHER | Age: 19
Discharge: HOME | End: 2019-09-20
Attending: ADVANCED PRACTICE MIDWIFE
Payer: COMMERCIAL

## 2019-09-20 DIAGNOSIS — O36.5910 POOR FETAL GROWTH AFFECTING MANAGEMENT OF MOTHER IN FIRST TRIMESTER, SINGLE OR UNSPECIFIED FETUS: ICD-10-CM

## 2019-09-20 PROCEDURE — 76818 FETAL BIOPHYS PROFILE W/NST: CPT

## 2019-09-20 PROCEDURE — 76818 FETAL BIOPHYS PROFILE W/NST: CPT | Mod: 26,,, | Performed by: PEDIATRICS

## 2019-09-20 PROCEDURE — 76818 PR US, OB, FETAL BIOPHYSICAL, W/NST: ICD-10-PCS | Mod: 26,,, | Performed by: PEDIATRICS

## 2019-09-24 ENCOUNTER — HOSPITAL ENCOUNTER (OUTPATIENT)
Dept: PERINATAL CARE | Facility: OTHER | Age: 19
Discharge: HOME | End: 2019-09-24
Attending: PHYSICIAN ASSISTANT
Payer: COMMERCIAL

## 2019-09-24 DIAGNOSIS — O36.5910 POOR FETAL GROWTH AFFECTING MANAGEMENT OF MOTHER IN FIRST TRIMESTER, SINGLE OR UNSPECIFIED FETUS: ICD-10-CM

## 2019-09-24 PROCEDURE — 76819 FETAL BIOPHYS PROFIL W/O NST: CPT

## 2019-09-26 ENCOUNTER — ROUTINE PRENATAL (OUTPATIENT)
Dept: OBSTETRICS AND GYNECOLOGY | Facility: CLINIC | Age: 19
End: 2019-09-26
Payer: COMMERCIAL

## 2019-09-26 ENCOUNTER — LAB VISIT (OUTPATIENT)
Dept: LAB | Facility: OTHER | Age: 19
End: 2019-09-26
Attending: ADVANCED PRACTICE MIDWIFE
Payer: COMMERCIAL

## 2019-09-26 VITALS
BODY MASS INDEX: 25.86 KG/M2 | SYSTOLIC BLOOD PRESSURE: 104 MMHG | WEIGHT: 155.44 LBS | DIASTOLIC BLOOD PRESSURE: 64 MMHG

## 2019-09-26 DIAGNOSIS — Z3A.36 36 WEEKS GESTATION OF PREGNANCY: ICD-10-CM

## 2019-09-26 DIAGNOSIS — Z3A.36 36 WEEKS GESTATION OF PREGNANCY: Primary | ICD-10-CM

## 2019-09-26 LAB
BASOPHILS # BLD AUTO: 0.02 K/UL (ref 0–0.2)
BASOPHILS NFR BLD: 0.2 % (ref 0–1.9)
DIFFERENTIAL METHOD: ABNORMAL
EOSINOPHIL # BLD AUTO: 0 K/UL (ref 0–0.5)
EOSINOPHIL NFR BLD: 0.2 % (ref 0–8)
ERYTHROCYTE [DISTWIDTH] IN BLOOD BY AUTOMATED COUNT: 13.4 % (ref 11.5–14.5)
HCT VFR BLD AUTO: 38 % (ref 37–48.5)
HGB BLD-MCNC: 12.5 G/DL (ref 12–16)
IMM GRANULOCYTES # BLD AUTO: 0.11 K/UL (ref 0–0.04)
IMM GRANULOCYTES NFR BLD AUTO: 0.9 % (ref 0–0.5)
LYMPHOCYTES # BLD AUTO: 2 K/UL (ref 1–4.8)
LYMPHOCYTES NFR BLD: 17.5 % (ref 18–48)
MCH RBC QN AUTO: 29.9 PG (ref 27–31)
MCHC RBC AUTO-ENTMCNC: 32.9 G/DL (ref 32–36)
MCV RBC AUTO: 91 FL (ref 82–98)
MONOCYTES # BLD AUTO: 1 K/UL (ref 0.3–1)
MONOCYTES NFR BLD: 8.4 % (ref 4–15)
NEUTROPHILS # BLD AUTO: 8.5 K/UL (ref 1.8–7.7)
NEUTROPHILS NFR BLD: 72.8 % (ref 38–73)
NRBC BLD-RTO: 0 /100 WBC
PLATELET # BLD AUTO: 217 K/UL (ref 150–350)
PMV BLD AUTO: 11.1 FL (ref 9.2–12.9)
RBC # BLD AUTO: 4.18 M/UL (ref 4–5.4)
RPR SER QL: NORMAL
WBC # BLD AUTO: 11.61 K/UL (ref 3.9–12.7)

## 2019-09-26 PROCEDURE — 87081 CULTURE SCREEN ONLY: CPT

## 2019-09-26 PROCEDURE — 0502F PR SUBSEQUENT PRENATAL CARE: ICD-10-PCS | Mod: CPTII,S$GLB,, | Performed by: ADVANCED PRACTICE MIDWIFE

## 2019-09-26 PROCEDURE — 85025 COMPLETE CBC W/AUTO DIFF WBC: CPT

## 2019-09-26 PROCEDURE — 87147 CULTURE TYPE IMMUNOLOGIC: CPT

## 2019-09-26 PROCEDURE — 86703 HIV-1/HIV-2 1 RESULT ANTBDY: CPT

## 2019-09-26 PROCEDURE — 99999 PR PBB SHADOW E&M-EST. PATIENT-LVL II: CPT | Mod: PBBFAC,,, | Performed by: ADVANCED PRACTICE MIDWIFE

## 2019-09-26 PROCEDURE — 0502F SUBSEQUENT PRENATAL CARE: CPT | Mod: CPTII,S$GLB,, | Performed by: ADVANCED PRACTICE MIDWIFE

## 2019-09-26 PROCEDURE — 87184 SC STD DISK METHOD PER PLATE: CPT

## 2019-09-26 PROCEDURE — 36415 COLL VENOUS BLD VENIPUNCTURE: CPT

## 2019-09-26 PROCEDURE — 86592 SYPHILIS TEST NON-TREP QUAL: CPT

## 2019-09-26 PROCEDURE — 99999 PR PBB SHADOW E&M-EST. PATIENT-LVL II: ICD-10-PCS | Mod: PBBFAC,,, | Performed by: ADVANCED PRACTICE MIDWIFE

## 2019-09-26 NOTE — PROGRESS NOTES
19 y.o. female  at 36w6d   Reports + FM, denies VB, LOF or regular CTX  Doing well without concerns   Having some cramps  GBS done and SVE, vtx, -1/0, 0cm, os post  Aunt here  TW oz   Delivery consents signed today  GBS collected today   Reviewed [place] department of [facility] recommendation for repeat HIV/RPR today; she having it drawn today  Reviewed warning signs, normal FKCs, labor precautions and how/when to call.  RTC x 1 wks, call or present sooner prn.     Birth Center Risk Assessment: 1-management on labor and Delivery    0- CNM management in ABC  1- CNM management on L&D  2- Consultation with OB to develop  plan of care  3- Collaborative CNM/OB management with delivery on L&D  4- Permanent referral of care to MD

## 2019-09-27 ENCOUNTER — HOSPITAL ENCOUNTER (OUTPATIENT)
Dept: PERINATAL CARE | Facility: OTHER | Age: 19
Discharge: HOME | End: 2019-09-27
Attending: ADVANCED PRACTICE MIDWIFE
Payer: COMMERCIAL

## 2019-09-27 DIAGNOSIS — O36.5910 POOR FETAL GROWTH AFFECTING MANAGEMENT OF MOTHER IN FIRST TRIMESTER, SINGLE OR UNSPECIFIED FETUS: ICD-10-CM

## 2019-09-27 LAB — HIV 1+2 AB+HIV1 P24 AG SERPL QL IA: NEGATIVE

## 2019-09-27 PROCEDURE — 59025 FETAL NON-STRESS TEST: CPT | Mod: 26,,, | Performed by: OBSTETRICS & GYNECOLOGY

## 2019-09-27 PROCEDURE — 59025 PR FETAL 2N-STRESS TEST: ICD-10-PCS | Mod: 26,,, | Performed by: OBSTETRICS & GYNECOLOGY

## 2019-09-27 PROCEDURE — 59025 FETAL NON-STRESS TEST: CPT

## 2019-09-27 NOTE — PROGRESS NOTES
NST Reactive    See viewpoint report for details of fetal surveillance in prenatal testing center

## 2019-09-30 LAB — BACTERIA SPEC AEROBE CULT: ABNORMAL

## 2019-10-01 ENCOUNTER — HOSPITAL ENCOUNTER (OUTPATIENT)
Dept: PERINATAL CARE | Facility: OTHER | Age: 19
Discharge: HOME | End: 2019-10-01
Attending: ADVANCED PRACTICE MIDWIFE
Payer: COMMERCIAL

## 2019-10-01 ENCOUNTER — ROUTINE PRENATAL (OUTPATIENT)
Dept: OBSTETRICS AND GYNECOLOGY | Facility: CLINIC | Age: 19
End: 2019-10-01
Payer: COMMERCIAL

## 2019-10-01 VITALS
SYSTOLIC BLOOD PRESSURE: 108 MMHG | BODY MASS INDEX: 26.12 KG/M2 | WEIGHT: 156.94 LBS | DIASTOLIC BLOOD PRESSURE: 66 MMHG

## 2019-10-01 DIAGNOSIS — O36.5910 POOR FETAL GROWTH AFFECTING MANAGEMENT OF MOTHER IN FIRST TRIMESTER, SINGLE OR UNSPECIFIED FETUS: ICD-10-CM

## 2019-10-01 DIAGNOSIS — Z34.93 PRENATAL CARE IN THIRD TRIMESTER: Primary | ICD-10-CM

## 2019-10-01 DIAGNOSIS — O36.5930 POOR FETAL GROWTH AFFECTING MANAGEMENT OF MOTHER IN THIRD TRIMESTER, SINGLE OR UNSPECIFIED FETUS: ICD-10-CM

## 2019-10-01 PROCEDURE — 59025 PR FETAL 2N-STRESS TEST: ICD-10-PCS | Mod: 26,,, | Performed by: PEDIATRICS

## 2019-10-01 PROCEDURE — 0502F SUBSEQUENT PRENATAL CARE: CPT | Mod: CPTII,S$GLB,, | Performed by: ADVANCED PRACTICE MIDWIFE

## 2019-10-01 PROCEDURE — 59025 FETAL NON-STRESS TEST: CPT

## 2019-10-01 PROCEDURE — 0502F PR SUBSEQUENT PRENATAL CARE: ICD-10-PCS | Mod: CPTII,S$GLB,, | Performed by: ADVANCED PRACTICE MIDWIFE

## 2019-10-01 PROCEDURE — 99999 PR PBB SHADOW E&M-EST. PATIENT-LVL II: ICD-10-PCS | Mod: PBBFAC,,, | Performed by: ADVANCED PRACTICE MIDWIFE

## 2019-10-01 PROCEDURE — 99999 PR PBB SHADOW E&M-EST. PATIENT-LVL II: CPT | Mod: PBBFAC,,, | Performed by: ADVANCED PRACTICE MIDWIFE

## 2019-10-01 PROCEDURE — 59025 FETAL NON-STRESS TEST: CPT | Mod: 26,,, | Performed by: PEDIATRICS

## 2019-10-01 NOTE — PROGRESS NOTES
19 y.o. female  at 37w4d by sonogram  Reports + FM, denies VB, LOF or regular CTX  Doing well without concerns, presents with her Aunt today.  Patient is being followed closely with PNT and repeat sono FRI for IUGR--delivery recommendation to be made Friday  --discussed with patient today the likelihood of induction being recommended due to IUGR.  She verbalized understanding.  I mentioned we will go over induction methods next week if she remains pregnant, will f/u pending sono results Friday.    TW lbs   Delivery consents already signed  Reviewed GBS positive and need for intrapartum abx  Reviewed repeat HIV/RPR negative  Reviewed warning signs, normal FKCs--recommend daily with IUGR baby, labor precautions and how/when to call.  RTC x 1 wks, call or present sooner prn.   Birth Center Risk Assessment: 1  0- CNM management in ABC  1- CNM management on L&D  2- Consultation with OB to develop plan of care  3- Collaborative CNM/OB management with delivery on L&D  4- Referral or transfer of care to MD Zeinab Andrade CNM, MSN  10/01/2019  3:16 PM

## 2019-10-04 ENCOUNTER — ANESTHESIA EVENT (OUTPATIENT)
Dept: OBSTETRICS AND GYNECOLOGY | Facility: OTHER | Age: 19
End: 2019-10-04
Payer: COMMERCIAL

## 2019-10-04 ENCOUNTER — TELEPHONE (OUTPATIENT)
Dept: OBSTETRICS AND GYNECOLOGY | Facility: HOSPITAL | Age: 19
End: 2019-10-04

## 2019-10-04 ENCOUNTER — INITIAL CONSULT (OUTPATIENT)
Dept: MATERNAL FETAL MEDICINE | Facility: CLINIC | Age: 19
End: 2019-10-04
Payer: COMMERCIAL

## 2019-10-04 ENCOUNTER — HOSPITAL ENCOUNTER (INPATIENT)
Facility: OTHER | Age: 19
LOS: 3 days | Discharge: HOME | End: 2019-10-07
Attending: OBSTETRICS & GYNECOLOGY | Admitting: STUDENT IN AN ORGANIZED HEALTH CARE EDUCATION/TRAINING PROGRAM
Payer: COMMERCIAL

## 2019-10-04 ENCOUNTER — ANESTHESIA (OUTPATIENT)
Dept: OBSTETRICS AND GYNECOLOGY | Facility: OTHER | Age: 19
End: 2019-10-04
Payer: COMMERCIAL

## 2019-10-04 ENCOUNTER — PROCEDURE VISIT (OUTPATIENT)
Dept: MATERNAL FETAL MEDICINE | Facility: CLINIC | Age: 19
End: 2019-10-04
Payer: COMMERCIAL

## 2019-10-04 VITALS — WEIGHT: 162.25 LBS | DIASTOLIC BLOOD PRESSURE: 80 MMHG | SYSTOLIC BLOOD PRESSURE: 100 MMHG | BODY MASS INDEX: 27 KG/M2

## 2019-10-04 DIAGNOSIS — Z36.89 ENCOUNTER FOR ULTRASOUND TO CHECK FETAL GROWTH: ICD-10-CM

## 2019-10-04 DIAGNOSIS — O36.5939 IUGR (INTRAUTERINE GROWTH RESTRICTION) AFFECTING CARE OF MOTHER, THIRD TRIMESTER, OTHER FETUS: ICD-10-CM

## 2019-10-04 DIAGNOSIS — O43.199 MARGINAL INSERTION OF UMBILICAL CORD AFFECTING MANAGEMENT OF MOTHER: ICD-10-CM

## 2019-10-04 DIAGNOSIS — O99.820 GBS (GROUP B STREPTOCOCCUS CARRIER), +RV CULTURE, CURRENTLY PREGNANT: ICD-10-CM

## 2019-10-04 DIAGNOSIS — O36.5910 POOR FETAL GROWTH AFFECTING MANAGEMENT OF MOTHER IN FIRST TRIMESTER, SINGLE OR UNSPECIFIED FETUS: ICD-10-CM

## 2019-10-04 DIAGNOSIS — O36.5930 POOR FETAL GROWTH AFFECTING MANAGEMENT OF MOTHER IN THIRD TRIMESTER, SINGLE OR UNSPECIFIED FETUS: ICD-10-CM

## 2019-10-04 DIAGNOSIS — O36.5930 POOR FETAL GROWTH AFFECTING MANAGEMENT OF MOTHER IN THIRD TRIMESTER: ICD-10-CM

## 2019-10-04 PROBLEM — Z3A.36 36 WEEKS GESTATION OF PREGNANCY: Status: RESOLVED | Noted: 2019-09-26 | Resolved: 2019-10-04

## 2019-10-04 PROBLEM — Z34.90 ENCOUNTER FOR INDUCTION OF LABOR: Status: ACTIVE | Noted: 2019-10-04

## 2019-10-04 LAB
BASOPHILS # BLD AUTO: 0.03 K/UL (ref 0–0.2)
BASOPHILS NFR BLD: 0.3 % (ref 0–1.9)
DIFFERENTIAL METHOD: ABNORMAL
EOSINOPHIL # BLD AUTO: 0 K/UL (ref 0–0.5)
EOSINOPHIL NFR BLD: 0.2 % (ref 0–8)
ERYTHROCYTE [DISTWIDTH] IN BLOOD BY AUTOMATED COUNT: 13.3 % (ref 11.5–14.5)
HCT VFR BLD AUTO: 38.1 % (ref 37–48.5)
HGB BLD-MCNC: 12.7 G/DL (ref 12–16)
IMM GRANULOCYTES # BLD AUTO: 0.17 K/UL (ref 0–0.04)
IMM GRANULOCYTES NFR BLD AUTO: 1.7 % (ref 0–0.5)
LYMPHOCYTES # BLD AUTO: 3.2 K/UL (ref 1–4.8)
LYMPHOCYTES NFR BLD: 32.3 % (ref 18–48)
MCH RBC QN AUTO: 30 PG (ref 27–31)
MCHC RBC AUTO-ENTMCNC: 33.3 G/DL (ref 32–36)
MCV RBC AUTO: 90 FL (ref 82–98)
MONOCYTES # BLD AUTO: 0.6 K/UL (ref 0.3–1)
MONOCYTES NFR BLD: 6.5 % (ref 4–15)
NEUTROPHILS # BLD AUTO: 5.8 K/UL (ref 1.8–7.7)
NEUTROPHILS NFR BLD: 59 % (ref 38–73)
NRBC BLD-RTO: 0 /100 WBC
PLATELET # BLD AUTO: 195 K/UL (ref 150–350)
PMV BLD AUTO: 11.3 FL (ref 9.2–12.9)
RBC # BLD AUTO: 4.24 M/UL (ref 4–5.4)
WBC # BLD AUTO: 9.85 K/UL (ref 3.9–12.7)

## 2019-10-04 PROCEDURE — 85025 COMPLETE CBC W/AUTO DIFF WBC: CPT

## 2019-10-04 PROCEDURE — 76820 PR US, OB DOPPLER, FETAL UMBILICAL ARTERY ECHO: ICD-10-PCS | Mod: S$GLB,,, | Performed by: OBSTETRICS & GYNECOLOGY

## 2019-10-04 PROCEDURE — 76819 FETAL BIOPHYS PROFIL W/O NST: CPT | Mod: S$GLB,,, | Performed by: OBSTETRICS & GYNECOLOGY

## 2019-10-04 PROCEDURE — 99213 OFFICE O/P EST LOW 20 MIN: CPT | Mod: 25,S$GLB,, | Performed by: OBSTETRICS & GYNECOLOGY

## 2019-10-04 PROCEDURE — 25000003 PHARM REV CODE 250: Performed by: MIDWIFE

## 2019-10-04 PROCEDURE — 3008F PR BODY MASS INDEX (BMI) DOCUMENTED: ICD-10-PCS | Mod: CPTII,S$GLB,, | Performed by: OBSTETRICS & GYNECOLOGY

## 2019-10-04 PROCEDURE — 76820 UMBILICAL ARTERY ECHO: CPT | Mod: S$GLB,,, | Performed by: OBSTETRICS & GYNECOLOGY

## 2019-10-04 PROCEDURE — 63600175 PHARM REV CODE 636 W HCPCS: Performed by: MIDWIFE

## 2019-10-04 PROCEDURE — 72100002 HC LABOR CARE, 1ST 8 HOURS

## 2019-10-04 PROCEDURE — 99213 PR OFFICE/OUTPT VISIT, EST, LEVL III, 20-29 MIN: ICD-10-PCS | Mod: 25,S$GLB,, | Performed by: OBSTETRICS & GYNECOLOGY

## 2019-10-04 PROCEDURE — 86901 BLOOD TYPING SEROLOGIC RH(D): CPT

## 2019-10-04 PROCEDURE — 11000001 HC ACUTE MED/SURG PRIVATE ROOM

## 2019-10-04 PROCEDURE — 99999 PR PBB SHADOW E&M-EST. PATIENT-LVL III: ICD-10-PCS | Mod: PBBFAC,,, | Performed by: OBSTETRICS & GYNECOLOGY

## 2019-10-04 PROCEDURE — 99999 PR PBB SHADOW E&M-EST. PATIENT-LVL III: CPT | Mod: PBBFAC,,, | Performed by: OBSTETRICS & GYNECOLOGY

## 2019-10-04 PROCEDURE — 76819 PR US, OB, FETAL BIOPHYSICAL, W/O NST: ICD-10-PCS | Mod: S$GLB,,, | Performed by: OBSTETRICS & GYNECOLOGY

## 2019-10-04 PROCEDURE — 76816 PR  US,PREGNANT UTERUS,F/U,TRANSABD APP: ICD-10-PCS | Mod: S$GLB,,, | Performed by: OBSTETRICS & GYNECOLOGY

## 2019-10-04 PROCEDURE — 76816 OB US FOLLOW-UP PER FETUS: CPT | Mod: S$GLB,,, | Performed by: OBSTETRICS & GYNECOLOGY

## 2019-10-04 PROCEDURE — 3008F BODY MASS INDEX DOCD: CPT | Mod: CPTII,S$GLB,, | Performed by: OBSTETRICS & GYNECOLOGY

## 2019-10-04 RX ORDER — OXYTOCIN/RINGER'S LACTATE 30/500 ML
95 PLASTIC BAG, INJECTION (ML) INTRAVENOUS ONCE
Status: DISCONTINUED | OUTPATIENT
Start: 2019-10-04 | End: 2019-10-05

## 2019-10-04 RX ORDER — CALCIUM CARBONATE 200(500)MG
500 TABLET,CHEWABLE ORAL 3 TIMES DAILY PRN
Status: DISCONTINUED | OUTPATIENT
Start: 2019-10-04 | End: 2019-10-05

## 2019-10-04 RX ORDER — ONDANSETRON 8 MG/1
8 TABLET, ORALLY DISINTEGRATING ORAL EVERY 8 HOURS PRN
Status: DISCONTINUED | OUTPATIENT
Start: 2019-10-04 | End: 2019-10-05 | Stop reason: SDUPTHER

## 2019-10-04 RX ORDER — OXYTOCIN/RINGER'S LACTATE 30/500 ML
95 PLASTIC BAG, INJECTION (ML) INTRAVENOUS ONCE
Status: CANCELLED | OUTPATIENT
Start: 2019-10-04 | End: 2019-10-04

## 2019-10-04 RX ORDER — BUTORPHANOL TARTRATE 2 MG/ML
1 INJECTION INTRAMUSCULAR; INTRAVENOUS
Status: DISCONTINUED | OUTPATIENT
Start: 2019-10-04 | End: 2019-10-05

## 2019-10-04 RX ORDER — OXYTOCIN/RINGER'S LACTATE 30/500 ML
334 PLASTIC BAG, INJECTION (ML) INTRAVENOUS ONCE
Status: DISCONTINUED | OUTPATIENT
Start: 2019-10-04 | End: 2019-10-05

## 2019-10-04 RX ORDER — HYDROXYZINE PAMOATE 25 MG/1
50 CAPSULE ORAL ONCE
Status: COMPLETED | OUTPATIENT
Start: 2019-10-05 | End: 2019-10-04

## 2019-10-04 RX ORDER — SIMETHICONE 80 MG
1 TABLET,CHEWABLE ORAL 4 TIMES DAILY PRN
Status: DISCONTINUED | OUTPATIENT
Start: 2019-10-04 | End: 2019-10-05

## 2019-10-04 RX ORDER — SODIUM CHLORIDE, SODIUM LACTATE, POTASSIUM CHLORIDE, CALCIUM CHLORIDE 600; 310; 30; 20 MG/100ML; MG/100ML; MG/100ML; MG/100ML
INJECTION, SOLUTION INTRAVENOUS CONTINUOUS
Status: DISCONTINUED | OUTPATIENT
Start: 2019-10-04 | End: 2019-10-05

## 2019-10-04 RX ORDER — SODIUM CHLORIDE 9 MG/ML
INJECTION, SOLUTION INTRAVENOUS
Status: DISCONTINUED | OUTPATIENT
Start: 2019-10-04 | End: 2019-10-05

## 2019-10-04 RX ADMIN — HYDROXYZINE PAMOATE 50 MG: 25 CAPSULE ORAL at 11:10

## 2019-10-04 RX ADMIN — DINOPROSTONE 10 MG: 10 INSERT VAGINAL at 05:10

## 2019-10-04 RX ADMIN — SODIUM CHLORIDE, SODIUM LACTATE, POTASSIUM CHLORIDE, AND CALCIUM CHLORIDE: .6; .31; .03; .02 INJECTION, SOLUTION INTRAVENOUS at 05:10
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
## 2019-10-04 NOTE — TELEPHONE ENCOUNTER
Called patient to see if she was coming in to L&D for induction as recommended by Dr. Johnson earlier today. Left voicemail encouraging patient to come in and left phone number for L&D for any questions she may have.    Rocio Rueda MD  OBGYN PGY-2
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
## 2019-10-04 NOTE — HOSPITAL COURSE
Admit  IV access  CEFM/TOCO  Cervidil X 12 hours, pitocin likely after removal of cervidil  0830: no change in cervix since removal of cervidil, pitocin ordered  1729 Complete (reduced cervix)  1735  viable Male over intact perineum  1741 Placenta

## 2019-10-04 NOTE — HPI
, 38 weeks gestation, presents to unit for IOL d/t IUGR, US today showed baby in 2% at 5# 5oz, SD ratios normal
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
## 2019-10-04 NOTE — PROGRESS NOTES
Indication  ========    F/U Consultation. Follow-up evaluation for fetal growth. Evaluation of fetal well-being    History  ======    Previous Outcomes   2  Para 0  Abortions (A) 1  Miscarriages 1  Preg. no. 1  Outcome: miscarriage  Details: 7 weeks 2019    Pregnancy History  ==============    Maternal Lab Tests  Test: quad screen  Result:    NEGATIVE  DSR 1:9300    Maternal Assessment  =================    Weight 74 kg  Weight (lb) 163 lb  BP syst 100 mmHg  BP diast 80 mmHg    Method  ======    Transabdominal ultrasound examination, [place] E10. View: Suboptimal view: limited by late gestational age    Pregnancy  =========    Fry pregnancy. Number of fetuses: 1    Dating  ======    Cycle: regular cycle, , regular cycle  Ultrasound examination on: 10/4/2019  GA by U/S based upon: AC, BPD, Femur, HC  GA by U/S 33 w + 6 d  DARIA by U/S: 2019  Assigned: Dating performed on 2019, based on ultrasound (AC, BPD, Femur, HC)  Assigned GA 38 w + 0 d  Assigned DARIA: 10/18/2019  Pregnancy length 280 d    General Evaluation  ==============    Cardiac activity present.  bpm.  Fetal movements visualized.  Presentation cephalic.  Placenta posterior.  Umbilical cord 3 vessel cord.  Amniotic fluid normal amount, MVP 3.6 cm. NICK 6.7 cm. Q1 3.6 cm, Q2 0.0 cm, Q3 3.2 cm, Q4 0.0 cm.    Biophysical Profile  ==============    2: Fetal breathing movements  2: Gross body movements  2: Fetal tone  2: Amniotic fluid volume   Biophysical profile score  Interpretation: normal    Fetal Biometry  ============    Standard  BPD 81.4 mm  32w 5d                Hadlock    .1 mm  35w 3d                Kobe    .7 mm  34w 0d                Hadlock    .9 mm  35w 2d                Hadlock    Femur 64.7 mm  33w 3d                Hadlock    HC / AC 0.97    EFW 2,420 g          2%        Morales    EFW (lb) 5 lb  EFW (oz) 5 oz  EFW by: Marilu (BPD-HC-AC-FL)  Head / Face / Neck  Cephalic  index 0.76    Extremities / Bony Struc  FL / BPD 0.79    FL / AC 0.21    Other Structures   bpm    Fetal Anatomy  ============    Lateral ventricles: normal  4-chamber view: normal  Stomach: normal  Kidneys: normal  Bladder: normal  Gender: male  Wants to know gender: no  Other: A full anatomic survey has been previously performed.    Fetal Doppler  ===========    Arterial  Umbilical A PI 1.08          93%        Keith    Umbilical A RI 0.65          86%        Keith    Umbilical A PS -72.92 cm/s    Umbilical A ED -25.27 cm/s  Umbilical A TAmax -46.32 cm/s    Umbilical A MD -25.17 cm/s  Umbilical A S / D 2.85          81%        Keith    Umbilical A  bpm    Consultation  ==========    100/80  Reviewed findings from today's ultrasound with patient.  IUGR now at the 2nd percentile. At this gestational age, delivery is recommended. Patient counseled on recommendations for induction of  labor today.  Etiology cannot be identified prenatally but reviewed with patient and her aunt that IUGR can be associated with an increased risk of  non-reassuring fetal status or stillbirth.  Patient is reluctant to have an induction. I advised her that this is recommended given the potential fetal/ risks.  We discussed that she could go get her belongings and I would advise her to come back to L&D within 2 hours.  MDs notified; advised L&D MDs to call if patient not back within 3 hours.  Patient was counseled about risk of stillbirth with ongoing pregnancy.  Time  I overall spent approximately 15 minutes in face to face time with the patient and her family, greater than 50% of which was in counseling and  care coordination.    Impression  =========    Fry, living IUP.  Fetal growth has decelerated and the EFW now plots at the 2nd percentile for gestational age.  Normal repeat limited anatomy survey.  BPP .  Normal AFV.  UA Doppler S/D ratio is normal.    Recommendation  ==============    Recommend  proceeding with induction of labor today; patient advised to go get her belongings and return to L&D within 2 hours.  MDs on L&D notified; Joellen to notify L&D RN staff.

## 2019-10-04 NOTE — PROGRESS NOTES
Dr. Johnson admitting pt to be delivered today due to IUGR, charge nurse of L&D [facility], called and notified of pt's upcoming arrival.

## 2019-10-05 PROBLEM — O36.5939 IUGR (INTRAUTERINE GROWTH RESTRICTION) AFFECTING CARE OF MOTHER, THIRD TRIMESTER, OTHER FETUS: Status: ACTIVE | Noted: 2019-10-05

## 2019-10-05 PROBLEM — Z34.90 ENCOUNTER FOR INDUCTION OF LABOR: Status: RESOLVED | Noted: 2019-10-04 | Resolved: 2019-10-05

## 2019-10-05 PROBLEM — O36.5930 POOR FETAL GROWTH AFFECTING MANAGEMENT OF MOTHER IN THIRD TRIMESTER: Status: RESOLVED | Noted: 2019-09-14 | Resolved: 2019-10-05

## 2019-10-05 PROBLEM — O43.199 MARGINAL INSERTION OF UMBILICAL CORD AFFECTING MANAGEMENT OF MOTHER: Status: RESOLVED | Noted: 2019-06-18 | Resolved: 2019-10-05

## 2019-10-05 LAB
ABO + RH BLD: NORMAL
BLD GP AB SCN CELLS X3 SERPL QL: NORMAL

## 2019-10-05 PROCEDURE — 0502F SUBSEQUENT PRENATAL CARE: CPT | Mod: ,,, | Performed by: ADVANCED PRACTICE MIDWIFE

## 2019-10-05 PROCEDURE — 25000003 PHARM REV CODE 250: Performed by: MIDWIFE

## 2019-10-05 PROCEDURE — 72100003 HC LABOR CARE, EA. ADDL. 8 HRS

## 2019-10-05 PROCEDURE — 59400 OBSTETRICAL CARE: CPT | Mod: AT,,, | Performed by: ADVANCED PRACTICE MIDWIFE

## 2019-10-05 PROCEDURE — 72200004 HC VAGINAL DELIVERY LEVEL I

## 2019-10-05 PROCEDURE — 0502F PR SUBSEQUENT PRENATAL CARE: ICD-10-PCS | Mod: ,,, | Performed by: ADVANCED PRACTICE MIDWIFE

## 2019-10-05 PROCEDURE — 63600175 PHARM REV CODE 636 W HCPCS: Performed by: ADVANCED PRACTICE MIDWIFE

## 2019-10-05 PROCEDURE — 72200005 HC VAGINAL DELIVERY LEVEL II

## 2019-10-05 PROCEDURE — 59400 PR FULL ROUT OBSTE CARE,VAGINAL DELIV: ICD-10-PCS | Mod: AT,,, | Performed by: ADVANCED PRACTICE MIDWIFE

## 2019-10-05 PROCEDURE — 25000003 PHARM REV CODE 250: Performed by: ADVANCED PRACTICE MIDWIFE

## 2019-10-05 PROCEDURE — 11000001 HC ACUTE MED/SURG PRIVATE ROOM

## 2019-10-05 PROCEDURE — 63600175 PHARM REV CODE 636 W HCPCS: Performed by: MIDWIFE

## 2019-10-05 PROCEDURE — 72100002 HC LABOR CARE, 1ST 8 HOURS

## 2019-10-05 RX ORDER — DIPHENHYDRAMINE HCL 25 MG
25 CAPSULE ORAL EVERY 4 HOURS PRN
Status: DISCONTINUED | OUTPATIENT
Start: 2019-10-05 | End: 2019-10-07 | Stop reason: HOSPADM

## 2019-10-05 RX ORDER — OXYTOCIN/RINGER'S LACTATE 30/500 ML
95 PLASTIC BAG, INJECTION (ML) INTRAVENOUS ONCE
Status: DISCONTINUED | OUTPATIENT
Start: 2019-10-05 | End: 2019-10-07 | Stop reason: HOSPADM

## 2019-10-05 RX ORDER — DOCUSATE SODIUM 100 MG/1
200 CAPSULE, LIQUID FILLED ORAL 2 TIMES DAILY PRN
Status: DISCONTINUED | OUTPATIENT
Start: 2019-10-05 | End: 2019-10-07 | Stop reason: HOSPADM

## 2019-10-05 RX ORDER — HYDROCORTISONE 25 MG/G
CREAM TOPICAL 3 TIMES DAILY PRN
Status: DISCONTINUED | OUTPATIENT
Start: 2019-10-05 | End: 2019-10-07 | Stop reason: HOSPADM

## 2019-10-05 RX ORDER — HYDROCODONE BITARTRATE AND ACETAMINOPHEN 5; 325 MG/1; MG/1
1 TABLET ORAL ONCE
Status: COMPLETED | OUTPATIENT
Start: 2019-10-05 | End: 2019-10-05

## 2019-10-05 RX ORDER — ONDANSETRON 8 MG/1
8 TABLET, ORALLY DISINTEGRATING ORAL EVERY 8 HOURS PRN
Status: DISCONTINUED | OUTPATIENT
Start: 2019-10-05 | End: 2019-10-07 | Stop reason: HOSPADM

## 2019-10-05 RX ORDER — ACETAMINOPHEN 325 MG/1
650 TABLET ORAL EVERY 6 HOURS PRN
Status: DISCONTINUED | OUTPATIENT
Start: 2019-10-05 | End: 2019-10-07 | Stop reason: HOSPADM

## 2019-10-05 RX ORDER — OXYTOCIN/RINGER'S LACTATE 30/500 ML
2 PLASTIC BAG, INJECTION (ML) INTRAVENOUS CONTINUOUS
Status: DISCONTINUED | OUTPATIENT
Start: 2019-10-05 | End: 2019-10-05

## 2019-10-05 RX ORDER — IBUPROFEN 600 MG/1
600 TABLET ORAL EVERY 6 HOURS PRN
Status: DISCONTINUED | OUTPATIENT
Start: 2019-10-05 | End: 2019-10-07 | Stop reason: HOSPADM

## 2019-10-05 RX ORDER — DIPHENHYDRAMINE HYDROCHLORIDE 50 MG/ML
25 INJECTION INTRAMUSCULAR; INTRAVENOUS EVERY 4 HOURS PRN
Status: DISCONTINUED | OUTPATIENT
Start: 2019-10-05 | End: 2019-10-07 | Stop reason: HOSPADM

## 2019-10-05 RX ADMIN — DEXTROSE 2.5 MILLION UNITS: 50 INJECTION, SOLUTION INTRAVENOUS at 10:10

## 2019-10-05 RX ADMIN — Medication 2 MILLI-UNITS/MIN: at 09:10

## 2019-10-05 RX ADMIN — SODIUM CHLORIDE, SODIUM LACTATE, POTASSIUM CHLORIDE, AND CALCIUM CHLORIDE: .6; .31; .03; .02 INJECTION, SOLUTION INTRAVENOUS at 09:10

## 2019-10-05 RX ADMIN — IBUPROFEN 600 MG: 600 TABLET ORAL at 10:10

## 2019-10-05 RX ADMIN — HYDROCODONE BITARTRATE AND ACETAMINOPHEN 1 TABLET: 5; 325 TABLET ORAL at 06:10

## 2019-10-05 RX ADMIN — DEXTROSE 5 MILLION UNITS: 50 INJECTION, SOLUTION INTRAVENOUS at 06:10

## 2019-10-05 RX ADMIN — BUTORPHANOL TARTRATE 1 MG: 2 INJECTION, SOLUTION INTRAMUSCULAR; INTRAVENOUS at 02:10
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
## 2019-10-05 NOTE — PROGRESS NOTES
10/05/19 1532   TeleStork Coreen Note - Strip   Strip Reviewed by Coreen Nurse? Yes   TeleSt[facility] Coreen Note - Communication   [place] Nurse Communicated with Bedside Nurse Regarding: Maternal Vital Signs   TeleStork Coreen Note - Notification   Nurse Notified? Yes   Name of Nurse Tiffanie WEIR
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
## 2019-10-05 NOTE — PROGRESS NOTES
"  S:   Dagmar is coping well with support; family remains at beside and supportive, reports pressure in her bottom, and pain with contractions.    O:   /73   Pulse 81   Temp 97.8 °F (36.6 °C)   Resp 18   Ht 5' 5" (1.651 m)   Wt 72.6 kg (160 lb)   LMP 2018   SpO2 100%   Breastfeeding? No   BMI 26.63 kg/m²   FHTs: baseline 130, moderate fabián, positive accels, no decels  Coffeen: ctns q 3 to 4 mins, lasting 40 to 60 secs,  Mild to moderate palpation, relaxed tone between  SVE 1-2//-1/ posterior/ soft/    Cephalic presentation  BOW intact  Meds infusing: PCN    A:  19 y.o.  IUP at 38w1d  IOL secondary to IUGR  Category 1 tracing        P:  Start pitocin--orders in  Epidural if desired  Anticipate   Zeinab Andrade CNM, MSN  10/05/2019  8:39 AM        "

## 2019-10-05 NOTE — PROGRESS NOTES
10/05/19 0935   TeleStork Coreen Note - Strip   Strip Reviewed by [facility]en Nurse? Yes   TeleStork Coreen Note - Communication   [place] Nurse Communicated with Bedside Nurse Regarding: Other (See Note)  (pulse oximeter to be on)   TeleStork [facility]en Note - Notification   Nurse Notified? Yes   Name of Nurse Katja GAGNON
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
## 2019-10-06 PROCEDURE — 25000003 PHARM REV CODE 250: Performed by: ADVANCED PRACTICE MIDWIFE

## 2019-10-06 PROCEDURE — 11000001 HC ACUTE MED/SURG PRIVATE ROOM

## 2019-10-06 RX ADMIN — IBUPROFEN 600 MG: 600 TABLET ORAL at 09:10

## 2019-10-06 RX ADMIN — IBUPROFEN 600 MG: 600 TABLET ORAL at 04:10

## 2019-10-06 RX ADMIN — ACETAMINOPHEN 650 MG: 325 TABLET, FILM COATED ORAL at 09:10

## 2019-10-06 RX ADMIN — IBUPROFEN 600 MG: 600 TABLET ORAL at 10:10

## 2019-10-06 NOTE — PROGRESS NOTES
No rupture time charted. Rupture time reported by ALE White RN present at delivery to be 1520 on 10/5/19 clear fluid. Pediatrician notified.

## 2019-10-06 NOTE — PLAN OF CARE
Encouraged nursing infant at least 8 times in 24 hours on cue until content. Discouraged bottles and pacifiers and risks of both discussed as well as benefits of breastfeeding.

## 2019-10-06 NOTE — PROGRESS NOTES
[facility]  Obstetrics  Postpartum Progress Note    Patient Name: Dagmar Quach  MRN: [id number]  Admission Date: 10/4/2019  Hospital Length of Stay: 2 days  Attending Physician: CALLIE Berry MD  Primary Care Provider: Taya Rodríguez MD    Subjective:     Principal Problem: (normal spontaneous vaginal delivery)    Hospital course:    10/4  Admit  IV access  CEFM/TOCO  Cervidil X 12 hours, pitocin likely after removal of cervidil  10/5/2019  0830: no change in cervix since removal of cervidil, pitocin ordered  1729 Complete (reduced cervix)  1735  viable Male over intact perineum EBL 100cc  1741 Placenta    Interval History: 10/6/2019: PPD #1 s/p  expected course    She is doing well this morning. She is tolerating a regular diet without nausea or vomiting. She is voiding spontaneously. She is ambulating. She has passed flatus, and has a BM. Vaginal bleeding is mild. She denies fever or chills. Abdominal pain is mild and controlled with oral medications. She is breastfeeding. She desires circumcision for her male baby: yes.     Objective:     Vital Signs (Most Recent):  Temp: 97.6 °F (36.4 °C) (10/06/19 0813)  Pulse: (!) 57 (10/06/19 0813)  Resp: 18 (10/06/19 0813)  BP: 112/77 (10/06/19 0813)  SpO2: 96 % (10/06/19 0813) Vital Signs (24h Range):  Temp:  [96.6 °F (35.9 °C)-98.6 °F (37 °C)] 97.6 °F (36.4 °C)  Pulse:  [57-81] 57  Resp:  [16-18] 18  SpO2:  [96 %-100 %] 96 %  BP: (111-141)/(66-84) 112/77     Weight: 72.6 kg (160 lb)  Body mass index is 26.63 kg/m².      Intake/Output Summary (Last 24 hours) at 10/6/2019 1054  Last data filed at 10/5/2019 1950  Gross per 24 hour   Intake --   Output 500 ml   Net -500 ml       Physical Exam   Constitutional: She is oriented to person, place, and time and well-developed, well-nourished, and in no distress.   Genitourinary:   Genitourinary Comments: Lochia scant rubra   HENT:   Head: Normocephalic and atraumatic.   Eyes: Conjunctivae  are normal.   Neck: Normal range of motion. Neck supple.   Cardiovascular: Normal rate and intact distal pulses.   Pulmonary/Chest: Effort normal.   Normal chest rise, no evidence of labored breathing.  Breasts: Nipples without bleeding/ cracking;pink and soft, filling   Abdominal: Soft.   Fundus firm and u/2   Musculoskeletal: Normal range of motion.   Neurological: She is alert and oriented to person, place, and time. Gait normal.   Skin: Skin is warm and dry.   Psychiatric: Mood, memory, affect and judgment normal.      Significant Labs:  Lab Results   Component Value Date    GROUPTRH O POS 10/04/2019    HEPBSAG Negative 2019    STREPBCULT (A) 2019     STREPTOCOCCUS AGALACTIAE (GROUP B)  Beta-hemolytic streptococci are routinely susceptible to   penicillins,cephalosporins and carbapenems.       Recent Labs   Lab 10/04/19  1703   HGB 12.7   HCT 38.1       I have personallly reviewed all pertinent lab results from the last 24 hours.  None    Assessment/Plan:     19 y.o. female  at 38w1d for:    Active Diagnoses:    Diagnosis Date Noted POA    PRINCIPAL PROBLEM:   (normal spontaneous vaginal delivery) [O80] 10/05/2019 Not Applicable    IUGR (intrauterine growth restriction) affecting care of mother, third trimester, other fetus [O36.5939] 10/05/2019 Yes    GBS (group B Streptococcus carrier), +RV culture, currently pregnant [O99.820] 10/04/2019 Not Applicable      Problems Resolved During this Admission:    Diagnosis Date Noted Date Resolved POA    IUGR (intrauterine growth restriction) affecting care of mother, third trimester, other fetus [O36.5939] 10/04/2019 10/04/2019 Yes    Encounter for induction of labor [Z34.90] 10/04/2019 10/05/2019 Not Applicable    Poor fetal growth affecting management of mother in third trimester [O36.5930] 2019 10/05/2019 Yes    Marginal insertion of umbilical cord affecting management of mother [O43.199] 2019 10/05/2019 Yes     Patient plans  to refuse Vitamin K injection for baby--discussed with patient risks/benefits of this decision and provided literature to reconsider.      Disposition: As patient meets milestones, will plan to discharge home tomorrow 10/7/2019.    Zeinab Andrade CNM  Obstetrics  [facility]

## 2019-10-07 VITALS
OXYGEN SATURATION: 97 % | RESPIRATION RATE: 18 BRPM | TEMPERATURE: 98 F | HEIGHT: 65 IN | DIASTOLIC BLOOD PRESSURE: 84 MMHG | WEIGHT: 160 LBS | HEART RATE: 64 BPM | BODY MASS INDEX: 26.66 KG/M2 | SYSTOLIC BLOOD PRESSURE: 123 MMHG

## 2019-10-07 PROBLEM — O99.820 GBS (GROUP B STREPTOCOCCUS CARRIER), +RV CULTURE, CURRENTLY PREGNANT: Status: RESOLVED | Noted: 2019-10-04 | Resolved: 2019-10-07

## 2019-10-07 PROCEDURE — 25000003 PHARM REV CODE 250: Performed by: ADVANCED PRACTICE MIDWIFE

## 2019-10-07 RX ADMIN — IBUPROFEN 600 MG: 600 TABLET ORAL at 04:10

## 2019-10-07 RX ADMIN — IBUPROFEN 600 MG: 600 TABLET ORAL at 03:10

## 2019-10-07 NOTE — DISCHARGE INSTRUCTIONS
Breastfeeding Discharge Instructions       Feed the baby at the earliest sign of hunger or comfort  o Hands to mouth, sucking motions  o Rooting or searching for something to suck on  o Dont wait for crying - it is a sign of distress     The feedings may be 8-12 times per 24hrs and will not follow a schedule   Avoid pacifiers and bottles for the first 4 weeks   Alternate the breast you start the feeding with, or start with the breast that feels the fullest   Switch breasts when the baby takes himself off the breast or falls asleep   Keep offering breasts until the baby looks full, no longer gives hunger signs, and stays asleep when placed on his back in the crib   If the baby is sleepy and wont wake for a feeding, put the baby skin-to-skin dressed in a diaper against the mothers bare chest   Sleep near your baby   The baby should be positioned and latched on to the breast correctly  o Chest-to-chest, chin in the breast  o Babys lips are flipped outward  o Babys mouth is stretched open wide like a shout  o Babys sucking should feel like tugging to the mother  - The baby should be drinking at the breast:  o You should hear swallowing or gulping throughout the feeding  o You should see milk on the babys lips when he comes off the breast  o Your breasts should be softer when the baby is finished feeding  o The baby should look relaxed at the end of feedings  o After the 4th day and your milk is in:  o The babys poop should turn bright yellow and be loose, watery, and seedy  o The baby should have at least 3-4 poops the size of the palm of your hand per day  o The baby should have at least 5-6 wet diapers per day  o The urine should be light yellow in color  You should drink when you are thirsty and eat a healthy diet when you are    hungry.     Take naps to get the rest you need.   Take medications and/or drink alcohol only with permission of your obstetrician    or the babys pediatrician.  You can  also call the Infant Risk Center,   ([phone]), Monday-Friday, 8am-5pm Central time, to get the most   up-to-date evidence-based information on the use of medications during   pregnancy and breastfeeding.      The baby should be examined by a pediatrician at 3-5 days of age.  Once your   milk comes in, the baby should be gaining at least ½ - 1oz each day and should be back to birthweight no later than 10-14 days of age.          Community Resources    [facility] Breastfeeding Warmline: [phone]   Local [facility] clinics: provide incentives and breastpumps to eligible mothers  [employer]he [employer] (LLLI):  mother-to-mother support group website        www.Bilbusl.[employer]  Local La Leche League mother-to-mother support groups:        www.Language123        La Leche League [facility]   Dr. Al Richards website for latch videos and general information:        www.breastfeedinginc.ca  Infant Risk Center is a call center that provides information about the safety of taking medications while breastfeeding.  Call [phone], M-F, 8am-5pm, CT.  International Lactation Consultant Association provides resources for assistance:        www.ilca.org  [place] Breastfeeding Coalition provides informationand resources for parents  and the community    www.LaBreastfeedingSupport.org     Twyla Gordon is a mom-to-mom support group:                             www.Gu[place]ool.comashishditlo.com//breastfeedng-support/  Partners for Healthy Babies:  5-052-377-BABY(7632)  Cafe au Lait: a breastfeeding support group for women of color, 470.910.3803

## 2019-10-07 NOTE — LACTATION NOTE
10/07/19 1400   Maternal Assessment   Breast Shape Right:;round   Breast Density soft;Right:   Areola Right:;elastic   Nipples Right:;everted   Right Nipple Symptoms tender;other (see comments)  (small compression stripe)   Maternal Infant Feeding   Maternal Emotional State relaxed   Lactation Referrals   Lactation Referrals outpatient lactation program;pediatric care provider;support group;WIC (women, infants and children) program   Provided discharge instruction.  Mother reports baby feeds approximately seven minutes at the breast.  LC discussed signs of good feeding and encouraged mother to observe baby's behavior at the breast to determine effective of feeding sessions.  In addition,encouraged mother to use feeding log to track baby's Is & Os. LC provided mother with community resources and encouraged mother to contact warm line with any questions or concerns. LC's number placed on white board and encouraged mother to call prior to discharge for latch assessment/assistance.

## 2019-10-07 NOTE — DISCHARGE SUMMARY
[facility]  Delivery Discharge Summary  Obstetrics    Admit Date: 10/4/2019    Discharge Date and Time:  10/07/2019 9:47 AM    Primary OB Clinician: ANNE Hendrickson    Attending Physician: CALLIE Berry MD     Discharge Provider: Belén Hidalgo    Reason for Admission: induction for IUGR    Estimated Date of Delivery: 10/18/19     Conditions: IUGR    Procedures Performed: * No surgery found *    Hospital Course (synopsis of major diagnoses, care, treatment, and services provided during the course of the hospital stay):    Dagmar Quach is a 19 y.o. now , PPD #1 who was admitted on 10/4/2019  for normal spontaneous vaginal delivery. On initial assessment, vital signs were stable and physical exam was normal. Infant was in cephalic presentation. Patient was subsequently admitted to labor and delivery unit with signed consents. Labor course was managed with support.  Patient delivered a single viable  male. Pt was in stable condition post-delivery and was transferred to the Mother-Baby Unit. Her postpartum course was uncomplicated. On discharge day, patient's pain is controlled with oral pain medications. Patient is tolerating PO without nausea or vomiting, ambulating, voiding. She denies SOB and CP. Denies any HA, vision changes, F/C, LE swelling. Denies any breast pain/soreness.     Delivery:    Episiotomy: None   Lacerations: None   Repair suture: None   Repair # of packets:     Blood loss (ml): 100     Birth information:  YOB: 2021   Time of birth: 5:35 PM   Sex: male   Delivery type: Vaginal, Spontaneous   Gestational Age: 38w1d    Delivery Clinician:      Other providers:       Additional  information:  Forceps:    Vacuum:    Breech:    Observed anomalies      Living?:           APGARS  One minute Five minutes Ten minutes   Skin color:         Heart rate:         Grimace:         Muscle tone:         Breathing:         Totals: 9  9         Placenta: Delivered:       appearance    Tubal Ligation: n/a    Feeding Method: breast    Rh Immune Globulin Given: no    Rubella Vaccine Given: no    Tdap Vaccine Given: no    Consults: none    Significant Diagnostic Studies: Labs: All labs within the past 24 hours have been reviewed    Final Diagnoses:   Principal Problem: IUGR (intrauterine growth restriction) affecting care of mother, third trimester, other fetus   Secondary Diagnoses:   Active Hospital Problems    Diagnosis  POA    *IUGR (intrauterine growth restriction) affecting care of mother, third trimester, other fetus [O36.5939]  Yes     (normal spontaneous vaginal delivery) [O80]  Not Applicable      Resolved Hospital Problems    Diagnosis Date Resolved POA    IUGR (intrauterine growth restriction) affecting care of mother, third trimester, other fetus [O36.5939] 10/04/2019 Yes    Encounter for induction of labor [Z34.90] 10/05/2019 Not Applicable    GBS (group B Streptococcus carrier), +RV culture, currently pregnant [O99.820] 10/07/2019 Not Applicable    Poor fetal growth affecting management of mother in third trimester [O36.5930] 10/05/2019 Yes     Scheduled for 2x weekly testing- has follow up growth US scheduled and determination for delivery rec will be made after that growth scan.      Marginal insertion of umbilical cord affecting management of mother [O43.199] 10/05/2019 Yes     Needs repeat sono at 30-32 weeks          Discharged Condition: good    Disposition: Home or Self Care    Follow Up/Patient Instructions:     Medications:  Reconciled Home Medications:      Medication List      ASK your doctor about these medications    azelastine 137 mcg (0.1 %) nasal spray  Commonly known as:  ASTELIN  2 sprays (274 mcg total) by Nasal route 2 (two) times daily.     desloratadine 5 mg tablet  Commonly known as:  CLARINEX  Take 1 tablet (5 mg total) by mouth once daily.     fluticasone propionate 50 mcg/actuation nasal spray  Commonly  known as:  FLONASE  1 spray (50 mcg total) by Each Nare route 2 (two) times daily.     prenat.vits,alok,min-iron-folic Tab  Commonly known as:  PRENATAL VITAMIN  Take 1 tablet by mouth once daily.          Discharge Procedure Orders   Diet Adult Regular     Notify your health care provider if you experience any of the following:  increased confusion or weakness     Notify your health care provider if you experience any of the following:  persistent dizziness, light-headedness, or visual disturbances     Notify your health care provider if you experience any of the following:  worsening rash     Notify your health care provider if you experience any of the following:  severe persistent headache     Notify your health care provider if you experience any of the following:  difficulty breathing or increased cough     Notify your health care provider if you experience any of the following:  redness, tenderness, or signs of infection (pain, swelling, redness, odor or green/yellow discharge around incision site)     Notify your health care provider if you experience any of the following:  severe uncontrolled pain     Notify your health care provider if you experience any of the following:  persistent nausea and vomiting or diarrhea     Notify your health care provider if you experience any of the following:  temperature >100.4     Activity as tolerated    consult prior to delivery  Will make appt with counselor this week.  Will call if any S/S of depression arise
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
## 2019-10-10 ENCOUNTER — TELEPHONE (OUTPATIENT)
Dept: OBSTETRICS AND GYNECOLOGY | Facility: CLINIC | Age: 19
End: 2019-10-10

## 2019-10-10 DIAGNOSIS — Z78.9 BREASTFEEDING (INFANT): Primary | ICD-10-CM

## 2019-10-10 NOTE — TELEPHONE ENCOUNTER
breastpump ordered and emailed to pt. Hard copy in office.     ----- Message from Eliud Fonseca MA sent at 10/10/2019  1:34 PM CDT -----  Contact: JAI VAUGHN       ----- Message -----  From: Brent Waldrop  Sent: 10/10/2019  12:55 PM CDT  To: [facility] Staff    Please refill the medication(s) listed below. The patient can be reached at this phone number once it is called into the pharmacy. [phone]    Medication #1: Breast Pump    Medication #2    Preferred Pharmacy: [id number] - [place], [place] - [address]

## 2019-10-11 ENCOUNTER — TELEPHONE (OUTPATIENT)
Dept: LACTATION | Facility: CLINIC | Age: 19
End: 2019-10-11

## 2019-10-11 NOTE — TELEPHONE ENCOUNTER
MARIYA spoke with mom.  Mom reports baby's weight increased by 1 ounce and has surpassed birthweight. Mom reports baby's weight at well child visit on 10/9/19 was [id number].  In addition, mom shared that within the last 24 hours, baby has produced five seedy yellow stools and five wet diapers.  Mom states she is feeding on demand and following baby's cues.  Mom was able to secure a pump through her insurance and experimented with pumping, which prompted mom to ask questions related to storage guidelines.  Mom expressed the desire to continue putting baby to breast, but will eventually will pump to begin storing milk prior to returning to work.Support given and patient agreed to contact warm line as needed.

## 2019-11-04 ENCOUNTER — TELEPHONE (OUTPATIENT)
Dept: OBSTETRICS AND GYNECOLOGY | Facility: OTHER | Age: 19
End: 2019-11-04

## 2019-11-04 NOTE — TELEPHONE ENCOUNTER
Pt and baby doing well. Baby had follow up gabe and has been BF well. Pt stated her pain and bleeding have been under control and is no longer taking pain medication. Pt is aware of warmline number. Pt has no questions or concerns at this time.

## 2019-11-12 ENCOUNTER — POSTPARTUM VISIT (OUTPATIENT)
Dept: OBSTETRICS AND GYNECOLOGY | Facility: CLINIC | Age: 19
End: 2019-11-12
Payer: COMMERCIAL

## 2019-11-12 VITALS
DIASTOLIC BLOOD PRESSURE: 84 MMHG | HEIGHT: 65 IN | BODY MASS INDEX: 23.2 KG/M2 | WEIGHT: 139.25 LBS | SYSTOLIC BLOOD PRESSURE: 120 MMHG

## 2019-11-12 DIAGNOSIS — N89.8 VAGINAL ITCHING: ICD-10-CM

## 2019-11-12 DIAGNOSIS — Z11.3 SCREENING FOR STD (SEXUALLY TRANSMITTED DISEASE): ICD-10-CM

## 2019-11-12 DIAGNOSIS — Z12.4 PAP SMEAR FOR CERVICAL CANCER SCREENING: ICD-10-CM

## 2019-11-12 PROBLEM — Z87.59 HISTORY OF PRIOR PREGNANCY WITH IUGR NEWBORN: Status: ACTIVE | Noted: 2019-10-05

## 2019-11-12 PROCEDURE — 99999 PR PBB SHADOW E&M-EST. PATIENT-LVL III: CPT | Mod: PBBFAC,,, | Performed by: MIDWIFE

## 2019-11-12 PROCEDURE — 88175 CYTOPATH C/V AUTO FLUID REDO: CPT

## 2019-11-12 PROCEDURE — 99999 PR PBB SHADOW E&M-EST. PATIENT-LVL III: ICD-10-PCS | Mod: PBBFAC,,, | Performed by: MIDWIFE

## 2019-11-12 PROCEDURE — 0503F POSTPARTUM CARE VISIT: CPT | Mod: S$GLB,,, | Performed by: MIDWIFE

## 2019-11-12 PROCEDURE — 0503F PR POSTPARTUM CARE VISIT: ICD-10-PCS | Mod: S$GLB,,, | Performed by: MIDWIFE

## 2019-11-12 PROCEDURE — 87491 CHLMYD TRACH DNA AMP PROBE: CPT

## 2019-11-12 RX ORDER — FLUCONAZOLE 150 MG/1
150 TABLET ORAL ONCE
Qty: 1 TABLET | Refills: 1 | Status: SHIPPED | OUTPATIENT
Start: 2019-11-12 | End: 2019-11-12
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
## 2019-11-13 LAB
C TRACH DNA SPEC QL NAA+PROBE: NOT DETECTED
N GONORRHOEA DNA SPEC QL NAA+PROBE: NOT DETECTED

## 2019-11-23 LAB
FINAL PATHOLOGIC DIAGNOSIS: NORMAL
Lab: NORMAL

## 2020-04-16 ENCOUNTER — OFFICE VISIT (OUTPATIENT)
Dept: URGENT CARE | Facility: CLINIC | Age: 20
End: 2020-04-16
Payer: COMMERCIAL

## 2020-04-16 VITALS
HEART RATE: 106 BPM | HEIGHT: 65 IN | WEIGHT: 139 LBS | TEMPERATURE: 99 F | RESPIRATION RATE: 18 BRPM | BODY MASS INDEX: 23.16 KG/M2 | OXYGEN SATURATION: 96 %

## 2020-04-16 DIAGNOSIS — D17.0 LIPOMA OF NECK: ICD-10-CM

## 2020-04-16 DIAGNOSIS — L02.11 ABSCESS, NECK: Primary | ICD-10-CM

## 2020-04-16 PROCEDURE — 99203 OFFICE O/P NEW LOW 30 MIN: CPT | Mod: S$GLB,,, | Performed by: PHYSICIAN ASSISTANT

## 2020-04-16 PROCEDURE — 99203 PR OFFICE/OUTPT VISIT, NEW, LEVL III, 30-44 MIN: ICD-10-PCS | Mod: S$GLB,,, | Performed by: PHYSICIAN ASSISTANT

## 2020-04-16 RX ORDER — SULFAMETHOXAZOLE AND TRIMETHOPRIM 800; 160 MG/1; MG/1
1 TABLET ORAL 2 TIMES DAILY
Qty: 14 TABLET | Refills: 0 | Status: SHIPPED | OUTPATIENT
Start: 2020-04-16 | End: 2020-04-26

## 2020-04-16 RX ORDER — MUPIROCIN 20 MG/G
OINTMENT TOPICAL 3 TIMES DAILY
Qty: 1 TUBE | Refills: 0 | Status: SHIPPED | OUTPATIENT
Start: 2020-04-16 | End: 2020-04-26

## 2020-04-17 NOTE — PATIENT INSTRUCTIONS
PLEASE READ YOUR DISCHARGE INSTRUCTIONS ENTIRELY AS IT CONTAINS IMPORTANT INFORMATION.    If you were prescribed antibiotics, please take them to completion.  Please supplement will OTC probiotics or yogurt while taking antibiotics.    Please use topical antibiotics 3 times a day.    Please do not breast feed while your taking antibiotics.    If not allergic, please take over the counter Tylenol (Acetaminophen) and/or Motrin (Ibuprofen) as directed for control of pain and/or fever.  Please follow up with your primary care doctor or specialist as needed.    Take a shower for personal hygiene, no bath.  Do not soak wound unless specifically instructed. If this is a recurrent issue, use hibiclens three times a week as body wash to help prevent future abscess(es), let stay on skin for 5 minutes before rinsing.off.        Please return or see your primary care doctor for signs of worsening infection (fever, worsening swelling/redness/pain, inability to move your extremity).      Please return or see your primary care doctor if you develop new or worsening symptoms.     Please arrange follow up with your primary medical clinic as soon as possible. You must understand that you've received an Urgent Care treatment only and that you may be released before all of your medical problems are known or treated. You, the patient, will arrange for follow up as instructed. If your symptoms worsen or fail to improve you should go to the Emergency Room.    WE CANNOT RULE OUT ALL POSSIBLE CAUSES OF YOUR SYMPTOMS IN THE URGENT CARE SETTING PLEASE GO TO THE ER IF YOU FEELS YOUR CONDITION IS WORSENING OR YOU WOULD LIKE EMERGENT EVALUATION.        Abscess (Antibiotic Treatment Only)  An abscess (sometimes called a boil) happens when bacteria get trapped under the skin and start to grow. Pus forms inside the abscess as the body responds to the bacteria. An abscess can happen with an insect bite, ingrown hair, blocked oil gland, pimple,  cyst, or puncture wound.  In the early stages, your wound may be red and tender. For this stage, you may get antibiotics. If the abscess does not get better with antibiotics, it will need to be drained with a small cut.  Home care  These tips will help you care for your abscess at home:  · Soak the wound in hot water or apply hot packs (small towel soaked in hot water) to the area for 20 minutes at a time. Do this 3 to 4 times a day.  · Do not cut, squeeze, or pop the boil yourself.  · Apply antibiotic cream or ointment to the skin 3 to 4 times a day, unless something else was prescribed. Some ointments include an antibiotic plus a pain reliever.  · If your doctor prescribed antibiotics, do not stop taking them until you have finished the medicine or the doctor tells you to stop.  · You may use an over-the-counter pain medicine to control pain, unless another pain medicine was prescribed. If you have chronic liver or kidney disease or ever had a stomach ulcer or gastrointestinal bleeding, talk with your doctor before using these any of these.  Follow-up care  Follow up with your healthcare provider, or as advised. Check your wound each day for the signs of worsening infection listed below.  When to seek medical advice  Get prompt medical attention if any of these occur:  · An increase in redness or swelling  · Red streaks in the skin leading away from the abscess  · An increase in local pain or swelling  · Fever of 100.4ºF (38ºC) or higher, or as directed by your healthcare provider  · Pus or fluid coming from the abscess  · Boil returns after getting better  Date Last Reviewed: 9/1/2016  © 9863-2822 [employer]. [address] [place] [address]. All rights reserved. This information is not intended as a substitute for professional medical care. Always follow your healthcare professional's instructions.

## 2020-04-17 NOTE — PROGRESS NOTES
"Subjective:       Patient ID: Dagmar Quach is a 19 y.o. female.    Vitals:  height is 5' 5" (1.651 m) and weight is 63 kg (139 lb). Her temperature is 98.6 °F (37 °C). Her pulse is 106. Her respiration is 18 and oxygen saturation is 96%.     Chief Complaint: Abscess    19-year-old female no significant past medical history besides vaginal delivery November 2019 (currently breast feeding) who presents for evaluation of posterior neck abscess.  Patient said it started a week ago and has progressively gotten worse after she tried to drain/squeezing it.  There was minimal drainage.  There is associated swelling, pain, and redness.  Denies any fever, chills, night sweats, nausea/vomiting, chest pain, shortness of breath, insect bites, or additional lesions.  She tried OTC Tylenol and Motrin for pain with mild relief.    Abscess   Chronicity:  NewProgression Since Onset: gradually worsening  Size:  3-5cm  Location:  Head/neck (Posterior neck)  Associated Symptoms: no fever, no chills, no sweats  Characteristics: draining, painful, redness, peeling, swelling and blistering    Characteristics comment: Stinging   Treatments Tried:  Draining/squeezing, warm water soaks, aspirin and NSAIDs (Tylenol and Peroxide)  Relieved by:  Nothing  Worsened by:  Draining/squeezing      Constitution: Negative for chills, sweating, fatigue and fever.   HENT: Negative for ear pain, facial swelling, facial trauma and trouble swallowing.    Neck: Negative for neck stiffness and painful lymph nodes.   Cardiovascular: Negative for chest pain, leg swelling, palpitations and sob on exertion.   Eyes: Negative for eye trauma, double vision and blurred vision.   Respiratory: Negative for cough, shortness of breath and asthma.    Gastrointestinal: Negative for abdominal pain, nausea, vomiting, constipation, diarrhea and rectal bleeding.   Genitourinary: Negative for dysuria, frequency, urgency, urine decreased, flank pain, bladder incontinence and " hematuria.   Musculoskeletal: Negative for pain, joint swelling, abnormal ROM of joint, muscle cramps and muscle ache.   Skin: Positive for abscess. Negative for color change, wound, abrasion, laceration and bruising.   Allergic/Immunologic: Negative for asthma and immunocompromised state.   Neurological: Negative for dizziness, history of vertigo, light-headedness, coordination disturbances, altered mental status and loss of consciousness.   Hematologic/Lymphatic: Negative for swollen lymph nodes and history of bleeding disorder.   Psychiatric/Behavioral: Negative for altered mental status.       Objective:      Physical Exam   Constitutional: She is oriented to person, place, and time. She appears well-developed and well-nourished. No distress.   HENT:   Head: Normocephalic and atraumatic.   Right Ear: External ear normal.   Left Ear: External ear normal.   Eyes: Pupils are equal, round, and reactive to light. Conjunctivae and EOM are normal.   Neck: Normal range of motion. Neck supple.   Cardiovascular: Normal rate, regular rhythm, normal heart sounds and intact distal pulses.   Pulmonary/Chest: Effort normal and breath sounds normal. No respiratory distress.   Abdominal: Soft. She exhibits no distension and no mass. There is no tenderness. There is no rebound and no guarding.   Musculoskeletal: Normal range of motion.   +/5 in all extremity.  Gait normal.   Lymphadenopathy:     She has no cervical adenopathy.   Neurological: She is alert and oriented to person, place, and time. She displays normal reflexes. No cranial nerve deficit or sensory deficit. She exhibits normal muscle tone. Coordination normal.   Skin: Skin is warm, dry and not diaphoretic. Capillary refill takes less than 2 seconds.        Psychiatric: She has a normal mood and affect.   Nursing note and vitals reviewed.                  Assessment:       1. Abscess, neck    2. Lipoma of neck          19-year-old female who is breast-feeding who  presents with posterior neck abscess.  No systemic symptoms.  There is scant purulence when attempted to Anish with 18 gauge needle.  Will treat with Bactrim and topical Bactroban.  Patient was advised to not breast feed while taking the antibiotics; she can resume breastfeeding 24 hr after she completes her last dose.  She understands of her symptoms worsen, she will need to be re-evaluated.  Plan:         Abscess, neck  -     sulfamethoxazole-trimethoprim 800-160mg (BACTRIM DS) 800-160 mg Tab; Take 1 tablet by mouth 2 (two) times daily. for 10 days  Dispense: 14 tablet; Refill: 0  -     mupirocin (BACTROBAN) 2 % ointment; Apply topically 3 (three) times daily. for 10 days  Dispense: 1 Tube; Refill: 0    Lipoma of neck    -incidental.  Asymptomatic.  No treatment necessary.         Patient Instructions   PLEASE READ YOUR DISCHARGE INSTRUCTIONS ENTIRELY AS IT CONTAINS IMPORTANT INFORMATION.    If you were prescribed antibiotics, please take them to completion.  Please supplement will OTC probiotics or yogurt while taking antibiotics.    Please use topical antibiotics 3 times a day.    Please do not breast feed while your taking antibiotics.    If not allergic, please take over the counter Tylenol (Acetaminophen) and/or Motrin (Ibuprofen) as directed for control of pain and/or fever.  Please follow up with your primary care doctor or specialist as needed.    Take a shower for personal hygiene, no bath.  Do not soak wound unless specifically instructed. If this is a recurrent issue, use hibiclens three times a week as body wash to help prevent future abscess(es), let stay on skin for 5 minutes before rinsing.off.        Please return or see your primary care doctor for signs of worsening infection (fever, worsening swelling/redness/pain, inability to move your extremity).      Please return or see your primary care doctor if you develop new or worsening symptoms.     Please arrange follow up with your primary medical  clinic as soon as possible. You must understand that you've received an Urgent Care treatment only and that you may be released before all of your medical problems are known or treated. You, the patient, will arrange for follow up as instructed. If your symptoms worsen or fail to improve you should go to the Emergency Room.    WE CANNOT RULE OUT ALL POSSIBLE CAUSES OF YOUR SYMPTOMS IN THE URGENT CARE SETTING PLEASE GO TO THE ER IF YOU FEELS YOUR CONDITION IS WORSENING OR YOU WOULD LIKE EMERGENT EVALUATION.        Abscess (Antibiotic Treatment Only)  An abscess (sometimes called a boil) happens when bacteria get trapped under the skin and start to grow. Pus forms inside the abscess as the body responds to the bacteria. An abscess can happen with an insect bite, ingrown hair, blocked oil gland, pimple, cyst, or puncture wound.  In the early stages, your wound may be red and tender. For this stage, you may get antibiotics. If the abscess does not get better with antibiotics, it will need to be drained with a small cut.  Home care  These tips will help you care for your abscess at home:  · Soak the wound in hot water or apply hot packs (small towel soaked in hot water) to the area for 20 minutes at a time. Do this 3 to 4 times a day.  · Do not cut, squeeze, or pop the boil yourself.  · Apply antibiotic cream or ointment to the skin 3 to 4 times a day, unless something else was prescribed. Some ointments include an antibiotic plus a pain reliever.  · If your doctor prescribed antibiotics, do not stop taking them until you have finished the medicine or the doctor tells you to stop.  · You may use an over-the-counter pain medicine to control pain, unless another pain medicine was prescribed. If you have chronic liver or kidney disease or ever had a stomach ulcer or gastrointestinal bleeding, talk with your doctor before using these any of these.  Follow-up care  Follow up with your healthcare provider, or as advised.  Check your wound each day for the signs of worsening infection listed below.  When to seek medical advice  Get prompt medical attention if any of these occur:  · An increase in redness or swelling  · Red streaks in the skin leading away from the abscess  · An increase in local pain or swelling  · Fever of 100.4ºF (38ºC) or higher, or as directed by your healthcare provider  · Pus or fluid coming from the abscess  · Boil returns after getting better  Date Last Reviewed: 9/1/2016  © 1745-2231 [employer]. [address] [place] [address], [place] [address]. All rights reserved. This information is not intended as a substitute for professional medical care. Always follow your healthcare professional's instructions.

## 2020-04-20 ENCOUNTER — CLINICAL SUPPORT (OUTPATIENT)
Dept: URGENT CARE | Facility: CLINIC | Age: 20
End: 2020-04-20
Payer: COMMERCIAL

## 2020-04-20 VITALS
BODY MASS INDEX: 23.16 KG/M2 | WEIGHT: 139 LBS | TEMPERATURE: 99 F | OXYGEN SATURATION: 96 % | HEART RATE: 100 BPM | HEIGHT: 65 IN

## 2020-04-20 DIAGNOSIS — L73.9 FOLLICULITIS: ICD-10-CM

## 2020-04-20 DIAGNOSIS — L02.91 ABSCESS: Primary | ICD-10-CM

## 2020-04-20 PROCEDURE — 10060 I&D ABSCESS SIMPLE/SINGLE: CPT | Mod: S$GLB,,, | Performed by: NURSE PRACTITIONER

## 2020-04-20 PROCEDURE — 10060 INCISION & DRAINAGE: ICD-10-PCS | Mod: S$GLB,,, | Performed by: NURSE PRACTITIONER

## 2020-04-20 RX ORDER — CEPHALEXIN 500 MG/1
500 CAPSULE ORAL EVERY 12 HOURS
Qty: 14 CAPSULE | Refills: 0 | Status: SHIPPED | OUTPATIENT
Start: 2020-04-20 | End: 2020-04-27

## 2020-04-20 NOTE — PROGRESS NOTES
"Subjective:       Patient ID: Dagmar Quach is a 19 y.o. female.    Vitals:  height is 5' 5" (1.651 m) and weight is 63 kg (139 lb). Her temperature is 98.6 °F (37 °C). Her pulse is 100. Her oxygen saturation is 96%.     Chief Complaint: Abscess    Ambulatory follow-up abscess the back of the neck.  She was seen here last week and had simple I&D with 18 gauge needle and prescribed antibiotics.  Patient states that she is breastfeeding and has not started taking the antibiotics. Notes worsening of abscess on back of neck.     Abscess   Chronicity:  RecurrentProgression Since Onset: gradually worsening  Size:  3-5cm  Location:  Head/neck  Characteristics: draining, painful, redness and swelling    Pain Scale:  9/10  Treatments Tried:  Warm water soaks and topical antibiotics  Relieved by:  Nothing  Worsened by:  Nothing      Constitution: Negative for fatigue.   HENT: Negative for facial swelling and facial trauma.    Neck: Positive for neck pain. Negative for neck stiffness.   Cardiovascular: Negative for chest trauma.   Eyes: Negative for eye trauma, double vision and blurred vision.   Gastrointestinal: Negative for abdominal trauma, abdominal pain and rectal bleeding.   Genitourinary: Negative for hematuria, missed menses, genital trauma and pelvic pain.   Musculoskeletal: Negative for pain, trauma, joint swelling and abnormal ROM of joint.   Skin: Positive for abscess. Negative for color change, wound, abrasion, laceration and bruising.   Neurological: Negative for dizziness, history of vertigo, light-headedness, coordination disturbances, altered mental status and loss of consciousness.   Hematologic/Lymphatic: Negative for history of bleeding disorder.   Psychiatric/Behavioral: Negative for altered mental status.       Objective:      Physical Exam   Constitutional: She is oriented to person, place, and time. She appears well-developed and well-nourished. She is cooperative.  Non-toxic appearance. She does not " "appear ill. No distress.   HENT:   Head: Normocephalic and atraumatic.   Right Ear: Hearing, tympanic membrane, external ear and ear canal normal.   Left Ear: Hearing, tympanic membrane, external ear and ear canal normal.   Nose: Nose normal. No mucosal edema, rhinorrhea or nasal deformity. No epistaxis. Right sinus exhibits no maxillary sinus tenderness and no frontal sinus tenderness. Left sinus exhibits no maxillary sinus tenderness and no frontal sinus tenderness.   Mouth/Throat: Uvula is midline, oropharynx is clear and moist and mucous membranes are normal. No trismus in the jaw. Normal dentition. No uvula swelling. No posterior oropharyngeal erythema.   Eyes: Conjunctivae and lids are normal. Right eye exhibits no discharge. Left eye exhibits no discharge. No scleral icterus.   Neck: Trachea normal, normal range of motion, full passive range of motion without pain and phonation normal. Neck supple.   Cardiovascular: Normal rate, regular rhythm, normal heart sounds, intact distal pulses and normal pulses.   Pulmonary/Chest: Effort normal and breath sounds normal. No respiratory distress.   Abdominal: Soft. Normal appearance and bowel sounds are normal. She exhibits no distension, no pulsatile midline mass and no mass. There is no tenderness.   Musculoskeletal: Normal range of motion. She exhibits no edema or deformity.   Neurological: She is alert and oriented to person, place, and time. She exhibits normal muscle tone. Coordination normal.   Skin: Skin is warm, dry, intact, not diaphoretic, not pale and abscessed.        Psychiatric: She has a normal mood and affect. Her speech is normal and behavior is normal. Judgment and thought content normal. Cognition and memory are normal.   Nursing note and vitals reviewed.   Line.    Incision & Drainage  Date/Time: 4/20/2020 11:45 AM  Performed by: ROM Clark  Authorized by: ROM Clark     Time out: Immediately prior to procedure a "time out" " was called to verify the correct patient, procedure, equipment, support staff and site/side marked as required.    Consent Done?:  Yes (Verbal)    Type:  Abscess  Body area:  Head/neck  Location details:  Neck  Local anesthetic: lidocaine 2% without epinephrine  Scalpel size:  11  Complexity:  Simple  Drainage characteristics: purlent eschar plug and scant pus.  Wound treatment:  Wound left open and wound packed  Packing material:  1/4 in iodoform gauze  Patient tolerance:  Patient tolerated the procedure well with no immediate complications        Assessment:       1. Abscess    2. Folliculitis        Plan:         Abscess  -     Incision & Drainage  -     cephALEXin (KEFLEX) 500 MG capsule; Take 1 capsule (500 mg total) by mouth every 12 (twelve) hours. for 7 days  Dispense: 14 capsule; Refill: 0    Folliculitis  -     Incision & Drainage  -     cephALEXin (KEFLEX) 500 MG capsule; Take 1 capsule (500 mg total) by mouth every 12 (twelve) hours. for 7 days  Dispense: 14 capsule; Refill: 0       Patient Instructions   Start Keflex until stop breast-feeding and then can start Bactrim.  Follow-up in 2 days for wound check.  Go to the emergency room for any worsening of symptoms  Folliculitis  Folliculitis is an inflammation of a hair follicle. A hair follicle is the little pocket where a hair grows out of the skin. Bacteria normally live on the skin. But sometimes bacteria can get trapped in a follicle and cause infection. This causes a bumpy rash. The area over the follicles is red and raised. It may itch or be painful. The bumps may have fluid (pus) inside. The pus may leak and then form crusts. Sores can spread to other areas of the body. Once it goes away, folliculitis can come back at any time. Severe cases may cause permanent hair loss and scarring.  Folliculitis can happen anywhere on the body where hair grows. It can be caused by rubbing from tight clothing. Ingrown hairs can cause it. Soaking in a hot tub or  swimming pool that has bacteria in the water can cause it. It may also occur if a hair follicle is blocked by a bandage.  Sores often go away in a few days with no treatment. In some cases, medicine may be given. A small piece of skin or pus may be taken to find the type of bacteria causing the infection.  Home care  The healthcare provider may prescribe an antibiotic cream or ointment.  Oral antibiotics may also be prescribed. Or you may be told to use an over-the-counter antibiotic cream. Follow all instructions when using any of these medicines.  General care:  · Apply warm, moist compresses to the sores for 20 minutes up to 3 times a day. You can make a compress by soaking a cloth in warm water. Squeeze out excess water.  · Dont cut, poke, or squeeze the sores. This can be painful and spread infection.  · Dont scratch the affected area. Scratching can delay healing.  · Dont shave the areas affected by folliculitis.  · If the sores leak fluid, cover the area with a nonstick gauze bandage. Use as little tape as possible. Carefully discard all soiled bandages.  · Dress in loose cotton clothing.  · Change sheets and blankets if they are soiled by pus. Wash all clothes, towels, sheets, and cloth diapers in soap and hot water. Do not share clothes, towels, or sheets with other family members.  · Do not soak the sores in bath water. This can spread infection. Instead, keep the area clean by gently washing sores with soap and warm water.  · Wash your hands or use antibacterial gels often to prevent spreading the bacteria.  Follow-up care  Follow up with your healthcare provider, or as advised.  When to seek medical advice  Call your healthcare provider right away if any of these occur:  · Fever of 100.4°F (38°C) or higher  · Spreading of the rash  · Rash does not get better with treatment  · Redness or swelling that gets worse  · Rash becomes more painful  · Foul-smelling fluid leaking from the skin  · Rash improves,  but then comes back   Date Last Reviewed: 11/1/2016  © 8618-9517 [employer]. [address] [place] [address], [place] [address]. All rights reserved. This information is not intended as a substitute for professional medical care. Always follow your healthcare professional's instructions.

## 2020-04-20 NOTE — PATIENT INSTRUCTIONS
Start Keflex until stop breast-feeding and then can start Bactrim.  Follow-up in 2 days for wound check.  Go to the emergency room for any worsening of symptoms  Folliculitis  Folliculitis is an inflammation of a hair follicle. A hair follicle is the little pocket where a hair grows out of the skin. Bacteria normally live on the skin. But sometimes bacteria can get trapped in a follicle and cause infection. This causes a bumpy rash. The area over the follicles is red and raised. It may itch or be painful. The bumps may have fluid (pus) inside. The pus may leak and then form crusts. Sores can spread to other areas of the body. Once it goes away, folliculitis can come back at any time. Severe cases may cause permanent hair loss and scarring.  Folliculitis can happen anywhere on the body where hair grows. It can be caused by rubbing from tight clothing. Ingrown hairs can cause it. Soaking in a hot tub or swimming pool that has bacteria in the water can cause it. It may also occur if a hair follicle is blocked by a bandage.  Sores often go away in a few days with no treatment. In some cases, medicine may be given. A small piece of skin or pus may be taken to find the type of bacteria causing the infection.  Home care  The healthcare provider may prescribe an antibiotic cream or ointment.  Oral antibiotics may also be prescribed. Or you may be told to use an over-the-counter antibiotic cream. Follow all instructions when using any of these medicines.  General care:  · Apply warm, moist compresses to the sores for 20 minutes up to 3 times a day. You can make a compress by soaking a cloth in warm water. Squeeze out excess water.  · Dont cut, poke, or squeeze the sores. This can be painful and spread infection.  · Dont scratch the affected area. Scratching can delay healing.  · Dont shave the areas affected by folliculitis.  · If the sores leak fluid, cover the area with a nonstick gauze bandage. Use as little tape as  possible. Carefully discard all soiled bandages.  · Dress in loose cotton clothing.  · Change sheets and blankets if they are soiled by pus. Wash all clothes, towels, sheets, and cloth diapers in soap and hot water. Do not share clothes, towels, or sheets with other family members.  · Do not soak the sores in bath water. This can spread infection. Instead, keep the area clean by gently washing sores with soap and warm water.  · Wash your hands or use antibacterial gels often to prevent spreading the bacteria.  Follow-up care  Follow up with your healthcare provider, or as advised.  When to seek medical advice  Call your healthcare provider right away if any of these occur:  · Fever of 100.4°F (38°C) or higher  · Spreading of the rash  · Rash does not get better with treatment  · Redness or swelling that gets worse  · Rash becomes more painful  · Foul-smelling fluid leaking from the skin  · Rash improves, but then comes back   Date Last Reviewed: 11/1/2016  © 1832-5158 [employer]. [address] [place] [address], [place] [address]. All rights reserved. This information is not intended as a substitute for professional medical care. Always follow your healthcare professional's instructions.

## 2020-04-20 NOTE — PROCEDURES
"Incision & Drainage  Date/Time: 4/20/2020 11:45 AM  Performed by: ROM Clark  Authorized by: ROM Clark     Time out: Immediately prior to procedure a "time out" was called to verify the correct patient, procedure, equipment, support staff and site/side marked as required.    Consent Done?:  Yes (Verbal)    Type:  Abscess  Body area:  Head/neck  Location details:  Neck  Local anesthetic: lidocaine 2% without epinephrine  Scalpel size:  11  Complexity:  Simple  Drainage characteristics: purlent eschar plug and scant pus.  Wound treatment:  Wound left open and wound packed  Packing material:  1/4 in iodoform gauze  Patient tolerance:  Patient tolerated the procedure well with no immediate complications      "

## 2020-04-22 ENCOUNTER — CLINICAL SUPPORT (OUTPATIENT)
Dept: URGENT CARE | Facility: CLINIC | Age: 20
End: 2020-04-22
Payer: COMMERCIAL

## 2020-04-22 VITALS
WEIGHT: 138 LBS | HEIGHT: 65 IN | HEART RATE: 96 BPM | BODY MASS INDEX: 22.99 KG/M2 | OXYGEN SATURATION: 96 % | RESPIRATION RATE: 14 BRPM | TEMPERATURE: 98 F

## 2020-04-22 DIAGNOSIS — L73.9 FOLLICULITIS: ICD-10-CM

## 2020-04-22 DIAGNOSIS — L02.11 ABSCESS, NECK: ICD-10-CM

## 2020-04-22 DIAGNOSIS — L02.91 ABSCESS: Primary | ICD-10-CM

## 2020-04-22 PROCEDURE — 99499 UNLISTED E&M SERVICE: CPT | Mod: S$GLB,,, | Performed by: NURSE PRACTITIONER

## 2020-04-22 PROCEDURE — 99499 NO LOS: ICD-10-PCS | Mod: S$GLB,,, | Performed by: NURSE PRACTITIONER

## 2020-04-22 NOTE — PROGRESS NOTES
"Subjective:       Patient ID: Dagmar Quach is a 19 y.o. female.    Vitals:  height is 5' 5" (1.651 m) and weight is 62.6 kg (138 lb). Her oral temperature is 98.4 °F (36.9 °C). Her pulse is 96. Her respiration is 14 and oxygen saturation is 96%.     Chief Complaint: Wound Check    Ambulatory f/u abcess to posterior neck. Patient states feels much better.     Wound Check   She was originally treated 3 to 5 days ago (Return visit from [facility] drained on Monday ). Previous treatment included oral antibiotics. Her temperature was unmeasured prior to arrival. There has been bloody discharge from the wound. There is no redness present. There is no swelling present. The pain has improved. She has no difficulty moving the affected extremity or digit.       Constitution: Negative for chills, fatigue and fever.   HENT: Negative for congestion and sore throat.    Neck: Negative for painful lymph nodes.   Cardiovascular: Negative for chest pain and leg swelling.   Eyes: Negative for double vision and blurred vision.   Respiratory: Negative for cough and shortness of breath.    Gastrointestinal: Negative for nausea, vomiting and diarrhea.   Genitourinary: Negative for dysuria, frequency, urgency and history of kidney stones.   Musculoskeletal: Negative for joint pain, joint swelling, muscle cramps and muscle ache.   Skin: Positive for abscess. Negative for color change, pale, rash, wound and bruising.   Allergic/Immunologic: Negative for seasonal allergies.   Neurological: Negative for dizziness, history of vertigo, light-headedness, passing out and headaches.   Hematologic/Lymphatic: Negative for swollen lymph nodes.   Psychiatric/Behavioral: Negative for nervous/anxious, sleep disturbance and depression. The patient is not nervous/anxious.        Objective:      Physical Exam   Constitutional: She is oriented to person, place, and time. She appears well-developed and well-nourished. She is cooperative.  Non-toxic appearance. " She does not appear ill. No distress.   HENT:   Head: Normocephalic and atraumatic.   Right Ear: Hearing, tympanic membrane, external ear and ear canal normal.   Left Ear: Hearing, tympanic membrane, external ear and ear canal normal.   Nose: Nose normal. No mucosal edema, rhinorrhea or nasal deformity. No epistaxis. Right sinus exhibits no maxillary sinus tenderness and no frontal sinus tenderness. Left sinus exhibits no maxillary sinus tenderness and no frontal sinus tenderness.   Mouth/Throat: Uvula is midline, oropharynx is clear and moist and mucous membranes are normal. No trismus in the jaw. Normal dentition. No uvula swelling. No posterior oropharyngeal erythema.   Eyes: Conjunctivae and lids are normal. Right eye exhibits no discharge. Left eye exhibits no discharge. No scleral icterus.   Neck: Trachea normal, normal range of motion, full passive range of motion without pain and phonation normal. Neck supple.   Cardiovascular: Normal rate, regular rhythm, normal heart sounds, intact distal pulses and normal pulses.   Pulmonary/Chest: Effort normal and breath sounds normal. No respiratory distress.   Abdominal: Soft. Normal appearance and bowel sounds are normal. She exhibits no distension, no pulsatile midline mass and no mass. There is no tenderness.   Musculoskeletal: Normal range of motion. She exhibits no edema or deformity.   Neurological: She is alert and oriented to person, place, and time. She exhibits normal muscle tone. Coordination normal.   Skin: Skin is warm, dry, intact, not diaphoretic and not pale.        Psychiatric: She has a normal mood and affect. Her speech is normal and behavior is normal. Judgment and thought content normal. Cognition and memory are normal.   Nursing note and vitals reviewed.        Assessment:       1. Abscess    2. Folliculitis    3. Abscess, neck        Plan:         Abscess    Folliculitis    Abscess, neck

## 2021-04-12 ENCOUNTER — PATIENT MESSAGE (OUTPATIENT)
Dept: RESEARCH | Facility: HOSPITAL | Age: 21
End: 2021-04-12

## 2023-06-27 NOTE — PROGRESS NOTES
"  [facility]        NAME: Alex Schilling is a 66 y o  female  : 1951    MRN: [id number]  DATE: 2023  TIME: 5:27 PM    Assessment and Plan   Contusion of right great toe without damage to nail, initial encounter [S90 111A]  1  Contusion of right great toe without damage to nail, initial encounter        2  Toe pain, right  XR foot 3+ vw right        - Denies need for boot, as she has one at home  - Xrays show no acute FXR    Patient Instructions   The final xray result will appear in your [employer] as needed  Acetaminophen and/or ibuprofen for pain and inflammation  PCP follow-up in 3-5 days  Proceed to the ER if symptoms worsen       Chief Complaint     Chief Complaint   Patient presents with   • Toe Injury     Pt states one week ago, she either dropped or banged her toe against a large plant on her right great toe  Pt had it wrapped, but it is not getting any better and there is now swelling  Pt is able to walk on it but states it hurts  She reports that she had a tendon injury on top of foot in the past and has decreased feeling in her foot  History of Present Illness       67 y/o F presents for traumatic R great toe pain x 12 days  Unaware of URIEL  Pt had stubbed her toe or dropped a plant on it; but cannot recall \"because it happened so fast \" Admits to prior injury of dropping a knife on it and severing a tendon  Has tried ACE wrapping without relief  Admits swelling is still present and gait difficulties  Review of Systems   Review of Systems   Musculoskeletal: Positive for gait problem and joint swelling           Current Medications       Current Outpatient Medications:   •  albuterol (PROVENTIL HFA,VENTOLIN HFA) 90 mcg/act inhaler, Inhale 2 puffs 4 (four) times a day, Disp: , Rfl:   •  amLODIPine (NORVASC) 5 mg tablet, Take 5 mg by mouth daily  , Disp: , Rfl:   •  ascorbic acid (VITAMIN C) 500 mg tablet, Take 500 mg by mouth daily, Disp: , Rfl:   •  benzonatate " Received prenatal records from outside clinic:    Noted within there that previous provider made note of patient attempting suicide 3/31/2019 (EGA 9wks at that time) - admitted to psych facility from ED for psych hold/evaluation the week following, and then discharged to home.    Called pt and discussed further with her. Pt reports she is not currently on any psych medications. History of anxiety and was medicated with Trazadone 3yrs ago - took approx 2-3 doses and did not like how it made her feel, so discontinued use.  Reports she attempted to find a psychiatrist after being discharged from inpatient facility and had difficulty, so gave up.  She denies any current suicidal thoughts/ideation/plans and reports she feels she has improved since that time.    Referral placed - discussed need for counseling/psychiatric evaluation and pt in agreement. Message sent to Dr. Deleon here in an effort to facilitate care for her.   (TESSALON PERLES) 100 mg capsule, Take 100 mg by mouth 3 (three) times a day as needed for cough, Disp: , Rfl:   •  Biotin 5000 MCG CAPS, Take 5,000 mcg by mouth in the morning, Disp: , Rfl:   •  budesonide (PULMICORT) 0 5 mg/2 mL nebulizer solution, Take 0 5 mg by nebulization as needed in the morning  Rinse mouth after use    , Disp: , Rfl:   •  calcium carbonate (OS-BRITTA) 600 MG tablet, Take 600 mg by mouth 2 (two) times a day with meals, Disp: , Rfl:   •  calcium polycarbophil (FIBERCON) 625 mg tablet, Take 1 tablet (625 mg total) by mouth daily, Disp: 30 tablet, Rfl: 11  •  cholecalciferol (VITAMIN D3) 1,000 units tablet, Take 1,000 Units by mouth daily, Disp: , Rfl:   •  dexlansoprazole (DEXILANT) 60 MG capsule, Take 1 capsule (60 mg total) by mouth daily, Disp: 90 capsule, Rfl: 1  •  GUAIFENESIN 1200 PO, Take 1,500 mg by mouth 2 (two) times a day, Disp: , Rfl:   •  hydrochlorothiazide (HYDRODIURIL) 25 mg tablet, TAKE 1 TABLET DAILY, Disp: 90 tablet, Rfl: 3  •  losartan (COZAAR) 100 MG tablet, TAKE 1 TABLET DAILY, Disp: 90 tablet, Rfl: 3  •  metFORMIN (GLUCOPHAGE-XR) 500 mg 24 hr tablet, Take 1,000 mg by mouth daily, Disp: , Rfl:   •  nitroglycerin (NITROSTAT) 0 4 mg SL tablet, , Disp: , Rfl:   •  OZEMPIC, 0 25 OR 0 5 MG/DOSE, 2 MG/1 5ML SOPN, , Disp: , Rfl:   •  potassium chloride (K-DUR,KLOR-CON) 20 mEq tablet, Take 20 mEq by mouth daily  , Disp: , Rfl:   •  Qvar RediHaler 40 MCG/ACT inhaler, Inhale 2 puffs 2 (two) times a day, Disp: , Rfl:   •  rosuvastatin (CRESTOR) 20 MG tablet, Take 20 mg by mouth daily  , Disp: , Rfl:   •  SYNTHROID 75 MCG tablet, Take 75 mcg by mouth daily in the early morning  , Disp: , Rfl:   •  venlafaxine (EFFEXOR-XR) 150 mg 24 hr capsule, Take 150 mg by mouth every other day, Disp: , Rfl:   •  venlafaxine (EFFEXOR-XR) 75 mg 24 hr capsule, Take 75 mg by mouth every other day, Disp: , Rfl:   •  ipratropium-albuterol, FOR EMS ONLY, (DUO-NEB) 0 5-2 5 mg/3 mL nebulizer solution, Use 3 mL as needed   (Patient not taking: Reported on 5/17/2023), Disp: , Rfl:     Current Allergies     Allergies as of 06/27/2023 - Reviewed 06/27/2023   Allergen Reaction Noted   • Epinephrine Shortness Of Breath and Palpitations 05/06/2020   • Carisoprodol Other (See Comments) 04/29/2020   • Irbesartan-hydrochlorothiazide Other (See Comments) 05/06/2020   • Lisinopril  05/06/2020   • Other Other (See Comments) 04/29/2020   • Tizanidine  05/06/2020            The following portions of the patient's history were reviewed and updated as appropriate: allergies, current medications, past family history, past medical history, past social history, past surgical history and problem list      Past Medical History:   Diagnosis Date   • Anxiety 03/29/2021   • Cataract 03/29/2021   • cough variant asthma    • COVID-19 03/06/2021    had again 3/29/22 (+antigen test)   • Diabetes mellitus ([facility] [address] )    • Disease of thyroid gland    • Diverticulosis     colonoscopy 7/31/20   • Fatty liver disease, nonalcoholic    • GERD (gastroesophageal reflux disease)     Last EGD 2016   • 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; treated with voriconazole 200 bid x 12 weeks   • Hx of migraine headaches     pt does not have   • Hyperlipidemia    • Hypertension    • Hypogammaglobulinemia ([address] )    • Pulmonary embolism (H[facility])    • Reactive airway disease    • Restless leg syndrome    • Sepsis ([address] )    • Streptococcal pneumonia ([facility] [address] )    • Tubular adenoma 07/31/2020    Dr Zach Wagner   recall 5 years       Past Surgical History:   Procedure Laterality Date   • BREAST BIOPSY Right     benign   • CARDIAC CATHETERIZATION     • CATARACT EXTRACTION, BILATERAL     • COLONOSCOPY     • CORNEA LACERATION REPAIR Right    • REFRACTIVE SURGERY     • TONSILLECTOMY     • UPPER GASTROINTESTINAL ENDOSCOPY     • WISDOM TOOTH EXTRACTION         Family History   Problem Relation Age of Onset   • Heart disease Mother    • Heart disease Father    • Stroke "Sister    • Heart disease Sister    • Lung cancer Sister 72   • No Known Problems Daughter    • No Known Problems Maternal Grandmother    • No Known Problems Maternal Grandfather    • No Known Problems Paternal Grandmother    • No Known Problems Paternal Grandfather    • Heart disease Brother    • No Known Problems Maternal Aunt    • No Known Problems Maternal Aunt    • No Known Problems Maternal Aunt    • No Known Problems Maternal Aunt    • No Known Problems Paternal Aunt    • No Known Problems Paternal Aunt    • Colon polyps Neg Hx    • Colon cancer Neg Hx          Medications have been verified  Objective   /82   Pulse 86   Resp 16   Ht 5' 4\" (1 626 m)   Wt 68 kg (150 lb)   LMP  (LMP Unknown)   SpO2 96%   BMI 25 75 kg/m²   No LMP recorded (lmp unknown)  Patient is postmenopausal        Physical Exam     Physical Exam  Vitals and nursing note reviewed  Constitutional:       General: She is not in acute distress  Appearance: She is not toxic-appearing  HENT:      Head: Normocephalic and atraumatic  Eyes:      Conjunctiva/sclera: Conjunctivae normal    Pulmonary:      Effort: Pulmonary effort is normal    Musculoskeletal:      Comments: DP pulse intact   Sensation intact  Swelling   Pain with Gait   ROM abnormal (at baseline)   Skin:     Findings: Bruising and erythema present  Comments: Erythema on Great toe  Not warm   Neurological:      Mental Status: She is alert     Psychiatric:         Mood and Affect: Mood normal          Behavior: Behavior normal                    "
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
## 2024-08-29 ENCOUNTER — HOSPITAL ENCOUNTER (EMERGENCY)
Facility: HOSPITAL | Age: 24
Discharge: HOME | End: 2024-08-29
Attending: EMERGENCY MEDICINE
Payer: COMMERCIAL

## 2024-08-29 VITALS
HEART RATE: 70 BPM | HEIGHT: 65 IN | RESPIRATION RATE: 16 BRPM | WEIGHT: 165 LBS | DIASTOLIC BLOOD PRESSURE: 62 MMHG | TEMPERATURE: 98 F | SYSTOLIC BLOOD PRESSURE: 110 MMHG | OXYGEN SATURATION: 98 % | BODY MASS INDEX: 27.49 KG/M2

## 2024-08-29 DIAGNOSIS — Z3A.19 19 WEEKS GESTATION OF PREGNANCY: ICD-10-CM
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
## 2024-08-29 PROCEDURE — 99282 EMERGENCY DEPT VISIT SF MDM: CPT
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
## 2024-08-29 NOTE — ED NOTES
Patient identifiers verified and correct for  MS Quach   C/C:  Lower abd  tenderness SEE NN  APPEARANCE: awake and alert in NAD. PAIN  0/10  SKIN: warm, dry and intact. No breakdown or bruising.  MUSCULOSKELETAL: Patient moving all extremities spontaneously, no obvious swelling or deformities noted. Ambulates independently.  RESPIRATORY: Denies shortness of breath.Respirations unlabored.   CARDIAC: Denies CP, 2+ distal pulses; no peripheral edema  ABDOMEN: Reports lower abd pain   : voids spontaneously, denies difficulty  Neurologic: AAO x 4; follows commands equal strength in all extremities; denies numbness/tingling. Denies dizziness  Denies new weakness,

## 2024-08-29 NOTE — DISCHARGE INSTRUCTIONS
Please follow-up with your gyn as scheduled  Please seek care if symptoms worsen.  Would recommend emergency department with gyn support such as [facility] or [place].  We are happy to see you here as well.
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
## 2024-10-09 ENCOUNTER — CLINICAL SUPPORT (OUTPATIENT)
Dept: OBSTETRICS AND GYNECOLOGY | Facility: CLINIC | Age: 24
End: 2024-10-09
Payer: MEDICAID

## 2024-10-09 DIAGNOSIS — N91.2 AMENORRHEA: Primary | ICD-10-CM

## 2024-10-09 PROCEDURE — 99212 OFFICE O/P EST SF 10 MIN: CPT | Mod: PBBFAC

## 2024-10-09 PROCEDURE — 99999 PR PBB SHADOW E&M-EST. PATIENT-LVL II: CPT | Mod: PBBFAC,,,

## 2024-10-09 NOTE — PROGRESS NOTES
Spoke with patient for a total of 16 minutes.  Updated chart to reflect up to date patient demographics.  Medication, pharmacy, and family history updated.  Patient was guided through expectations of OB/GYN care throughout history of pregnancy.  Pregnancy confirmation, dating u/s initial OB  scheduled for the pregnancy.

## 2024-10-29 ENCOUNTER — PATIENT MESSAGE (OUTPATIENT)
Dept: OTHER | Facility: OTHER | Age: 24
End: 2024-10-29
Payer: COMMERCIAL

## 2024-10-29 ENCOUNTER — LAB VISIT (OUTPATIENT)
Dept: LAB | Facility: OTHER | Age: 24
End: 2024-10-29
Attending: ADVANCED PRACTICE MIDWIFE
Payer: COMMERCIAL

## 2024-10-29 DIAGNOSIS — Z3A.28 28 WEEKS GESTATION OF PREGNANCY: ICD-10-CM

## 2024-10-29 PROBLEM — Z53.8 PAP SMEAR OF CERVIX NOT NEEDED: Status: ACTIVE | Noted: 2024-10-29

## 2024-10-29 LAB
BASOPHILS # BLD AUTO: 0.02 K/UL (ref 0–0.2)
BASOPHILS NFR BLD: 0.2 % (ref 0–1.9)
DIFFERENTIAL METHOD BLD: ABNORMAL
EOSINOPHIL # BLD AUTO: 0 K/UL (ref 0–0.5)
EOSINOPHIL NFR BLD: 0.4 % (ref 0–8)
ERYTHROCYTE [DISTWIDTH] IN BLOOD BY AUTOMATED COUNT: 13.8 % (ref 11.5–14.5)
GLUCOSE SERPL-MCNC: 80 MG/DL (ref 70–140)
HCT VFR BLD AUTO: 34 % (ref 37–48.5)
HGB BLD-MCNC: 11.2 G/DL (ref 12–16)
IMM GRANULOCYTES # BLD AUTO: 0.1 K/UL (ref 0–0.04)
IMM GRANULOCYTES NFR BLD AUTO: 1 % (ref 0–0.5)
LYMPHOCYTES # BLD AUTO: 1.5 K/UL (ref 1–4.8)
LYMPHOCYTES NFR BLD: 14.9 % (ref 18–48)
MCH RBC QN AUTO: 30.3 PG (ref 27–31)
MCHC RBC AUTO-ENTMCNC: 32.9 G/DL (ref 32–36)
MCV RBC AUTO: 92 FL (ref 82–98)
MONOCYTES # BLD AUTO: 0.7 K/UL (ref 0.3–1)
MONOCYTES NFR BLD: 7.6 % (ref 4–15)
NEUTROPHILS # BLD AUTO: 7.4 K/UL (ref 1.8–7.7)
NEUTROPHILS NFR BLD: 75.9 % (ref 38–73)
NRBC BLD-RTO: 0 /100 WBC
PLATELET # BLD AUTO: 195 K/UL (ref 150–450)
PMV BLD AUTO: 11.2 FL (ref 9.2–12.9)
RBC # BLD AUTO: 3.7 M/UL (ref 4–5.4)
TREPONEMA PALLIDUM IGG+IGM AB [PRESENCE] IN SERUM OR PLASMA BY IMMUNOASSAY: NONREACTIVE
WBC # BLD AUTO: 9.77 K/UL (ref 3.9–12.7)

## 2024-10-29 PROCEDURE — 85025 COMPLETE CBC W/AUTO DIFF WBC: CPT | Performed by: ADVANCED PRACTICE MIDWIFE

## 2024-10-29 PROCEDURE — 86593 SYPHILIS TEST NON-TREP QUANT: CPT | Performed by: ADVANCED PRACTICE MIDWIFE

## 2024-10-29 PROCEDURE — 82950 GLUCOSE TEST: CPT | Performed by: ADVANCED PRACTICE MIDWIFE

## 2024-10-29 PROCEDURE — 36415 COLL VENOUS BLD VENIPUNCTURE: CPT | Performed by: ADVANCED PRACTICE MIDWIFE

## 2024-11-12 ENCOUNTER — ROUTINE PRENATAL (OUTPATIENT)
Dept: OBSTETRICS AND GYNECOLOGY | Facility: CLINIC | Age: 24
End: 2024-11-12
Payer: COMMERCIAL

## 2024-11-12 ENCOUNTER — PATIENT MESSAGE (OUTPATIENT)
Dept: OTHER | Facility: OTHER | Age: 24
End: 2024-11-12
Payer: COMMERCIAL

## 2024-11-12 VITALS
WEIGHT: 173.5 LBS | HEART RATE: 82 BPM | SYSTOLIC BLOOD PRESSURE: 105 MMHG | BODY MASS INDEX: 28.87 KG/M2 | DIASTOLIC BLOOD PRESSURE: 61 MMHG

## 2024-11-12 DIAGNOSIS — M54.2 PAIN OF NECK WITH RECENT TRAUMATIC INJURY: ICD-10-CM

## 2024-11-12 DIAGNOSIS — Z3A.30 30 WEEKS GESTATION OF PREGNANCY: Primary | ICD-10-CM

## 2024-11-12 PROCEDURE — 0502F SUBSEQUENT PRENATAL CARE: CPT | Mod: S$GLB,,, | Performed by: ADVANCED PRACTICE MIDWIFE

## 2024-11-12 PROCEDURE — 99999 PR PBB SHADOW E&M-EST. PATIENT-LVL III: CPT | Mod: PBBFAC,,, | Performed by: ADVANCED PRACTICE MIDWIFE
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
## 2024-11-26 ENCOUNTER — ROUTINE PRENATAL (OUTPATIENT)
Dept: OBSTETRICS AND GYNECOLOGY | Facility: CLINIC | Age: 24
End: 2024-11-26
Payer: COMMERCIAL

## 2024-11-26 ENCOUNTER — PATIENT MESSAGE (OUTPATIENT)
Dept: OTHER | Facility: OTHER | Age: 24
End: 2024-11-26
Payer: COMMERCIAL

## 2024-11-26 VITALS
DIASTOLIC BLOOD PRESSURE: 66 MMHG | WEIGHT: 174.81 LBS | HEART RATE: 84 BPM | BODY MASS INDEX: 29.09 KG/M2 | SYSTOLIC BLOOD PRESSURE: 105 MMHG

## 2024-11-26 DIAGNOSIS — Z34.82 MULTIGRAVIDA IN SECOND TRIMESTER: Primary | ICD-10-CM

## 2024-11-26 DIAGNOSIS — Z86.59 HISTORY OF DEPRESSION: ICD-10-CM

## 2024-11-26 DIAGNOSIS — Z91.51 HISTORY OF SUICIDE ATTEMPT: ICD-10-CM

## 2024-11-26 DIAGNOSIS — Z87.59 HISTORY OF PRIOR PREGNANCY WITH IUGR NEWBORN: ICD-10-CM

## 2024-11-26 DIAGNOSIS — Z91.419 H/O ADULT VICTIM OF ABUSE: ICD-10-CM

## 2024-11-26 PROCEDURE — 99999 PR PBB SHADOW E&M-EST. PATIENT-LVL III: CPT | Mod: PBBFAC,,, | Performed by: ADVANCED PRACTICE MIDWIFE
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
## 2024-12-09 ENCOUNTER — PATIENT MESSAGE (OUTPATIENT)
Dept: OBSTETRICS AND GYNECOLOGY | Facility: CLINIC | Age: 24
End: 2024-12-09
Payer: MEDICAID

## 2024-12-17 ENCOUNTER — PATIENT MESSAGE (OUTPATIENT)
Dept: OTHER | Facility: OTHER | Age: 24
End: 2024-12-17
Payer: MEDICAID

## 2024-12-18 ENCOUNTER — ROUTINE PRENATAL (OUTPATIENT)
Dept: OBSTETRICS AND GYNECOLOGY | Facility: CLINIC | Age: 24
End: 2024-12-18
Payer: MEDICAID

## 2024-12-18 ENCOUNTER — CLINICAL SUPPORT (OUTPATIENT)
Dept: REHABILITATION | Facility: OTHER | Age: 24
End: 2024-12-18
Payer: MEDICAID

## 2024-12-18 ENCOUNTER — LAB VISIT (OUTPATIENT)
Dept: LAB | Facility: OTHER | Age: 24
End: 2024-12-18
Attending: ADVANCED PRACTICE MIDWIFE
Payer: MEDICAID

## 2024-12-18 VITALS
BODY MASS INDEX: 29.79 KG/M2 | HEART RATE: 104 BPM | WEIGHT: 179 LBS | DIASTOLIC BLOOD PRESSURE: 62 MMHG | SYSTOLIC BLOOD PRESSURE: 106 MMHG

## 2024-12-18 DIAGNOSIS — Z87.59 HISTORY OF PRIOR PREGNANCY WITH IUGR NEWBORN: ICD-10-CM

## 2024-12-18 DIAGNOSIS — M53.82 NECK MUSCLE WEAKNESS: ICD-10-CM

## 2024-12-18 DIAGNOSIS — R29.898 DECREASED RANGE OF MOTION OF NECK: ICD-10-CM

## 2024-12-18 DIAGNOSIS — Z3A.35 35 WEEKS GESTATION OF PREGNANCY: ICD-10-CM

## 2024-12-18 DIAGNOSIS — O26.00 EXCESSIVE WEIGHT GAIN AFFECTING PREGNANCY: ICD-10-CM

## 2024-12-18 DIAGNOSIS — M54.2 PAIN OF NECK WITH RECENT TRAUMATIC INJURY: Primary | ICD-10-CM

## 2024-12-18 DIAGNOSIS — Z3A.35 35 WEEKS GESTATION OF PREGNANCY: Primary | ICD-10-CM

## 2024-12-18 LAB
HIV 1+2 AB+HIV1 P24 AG SERPL QL IA: NEGATIVE
TREPONEMA PALLIDUM IGG+IGM AB [PRESENCE] IN SERUM OR PLASMA BY IMMUNOASSAY: NONREACTIVE

## 2024-12-18 PROCEDURE — 87389 HIV-1 AG W/HIV-1&-2 AB AG IA: CPT | Performed by: ADVANCED PRACTICE MIDWIFE

## 2024-12-18 PROCEDURE — 86593 SYPHILIS TEST NON-TREP QUANT: CPT | Performed by: ADVANCED PRACTICE MIDWIFE

## 2024-12-18 PROCEDURE — 97161 PT EVAL LOW COMPLEX 20 MIN: CPT | Mod: PN

## 2024-12-18 PROCEDURE — 99999 PR PBB SHADOW E&M-EST. PATIENT-LVL II: CPT | Mod: PBBFAC,,, | Performed by: ADVANCED PRACTICE MIDWIFE

## 2024-12-18 PROCEDURE — 99212 OFFICE O/P EST SF 10 MIN: CPT | Mod: PBBFAC,TH | Performed by: ADVANCED PRACTICE MIDWIFE

## 2024-12-18 PROCEDURE — 36415 COLL VENOUS BLD VENIPUNCTURE: CPT | Performed by: ADVANCED PRACTICE MIDWIFE
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
## 2024-12-18 NOTE — PROGRESS NOTES
24 y.o.,  at 35w1d, presents for routine OB appt.  Denies LOF, VB, or UCs. Reports good FM.  Doing well today, with concerns about back pain. She reports she fell on stairs at work two weeks ago. Has PT appt today.     Reports she desires STI screening with GBS collection next week - declines collection this week    TW lbs     PHYSICAL EXAM  GENERAL: No acute distress  HEENT: Normocephalic, atraumatic  NEURO: Alert and oriented x3  PULMONARY: Non-labored respiration; no tachypnea  ABD: Soft, gravid, nontender.    ASSESSMENT AND PLAN  35 weeks gestation of pregnancy  -     Treponema Pallidium Antibodies IgG, IgM; Future; Expected date: 2024  -     HIV 1/2 Ag/Ab (4th Gen); Future; Expected date: 2024  -     RSV, preF A and preF B(PF) (Abrysvo) vaccine 120 mcg  -     US MFM Procedure (Viewpoint)-Future; Future    History of prior pregnancy with IUGR   -     US MFM Procedure (Viewpoint)-Future; Future    Excessive weight gain affecting pregnancy  -     US MFM Procedure (Viewpoint)-Future; Future    Consents signed  Reviewed upcoming 36wk labs.   Reviewed patient does not have a history of genital HSV.     Reviewed ABC risk assessment with the patient:  Birth Center Risk Assessment: 0- Meets birth center guidelines  CNM management in ABC  CNM management on L&D  Consultation with OB to develop  plan of care  Collaborative CNM/OB management with delivery on L&D  Permanent referral of care to MD  Patient understands is currently a candidate for the ABC. Reviewed potential risks which could arise, that could change this status.    Reviewed warning signs, normal FM,  labor precautions, and how/when to call.  Confirmed pt has after-hours number.    Follow-up: 2 weeks, call or present sooner HENRY Monet, DESTINM, APRN

## 2024-12-18 NOTE — PATIENT INSTRUCTIONS
"Access Code: [email or web address]  URL: [email or web address]  Date: 12/18/2024  Prepared by: Joao Matt    Exercises  - Beginner Clam  - 3-4 x daily - 5-7 x weekly - 1-3 sets - 8-10 reps - 3" hold  - Seated Sciatic Tensioner  - 3-4 x daily - 5-7 x weekly - 1-3 sets - 8-10 reps - 3" hold  - Seated Scapular Retraction  - 3-4 x daily - 5-7 x weekly - 1-3 sets - 8-10 reps - 3" hold  - Seated Assisted Cervical Rotation with Towel  - 3-4 x daily - 5-7 x weekly - 1-3 sets - 8-10 reps - 3" hold  "

## 2024-12-24 ENCOUNTER — ROUTINE PRENATAL (OUTPATIENT)
Dept: OBSTETRICS AND GYNECOLOGY | Facility: CLINIC | Age: 24
End: 2024-12-24
Payer: MEDICAID

## 2024-12-24 VITALS
DIASTOLIC BLOOD PRESSURE: 64 MMHG | BODY MASS INDEX: 29.95 KG/M2 | WEIGHT: 180 LBS | HEART RATE: 78 BPM | SYSTOLIC BLOOD PRESSURE: 117 MMHG

## 2024-12-24 DIAGNOSIS — Z91.419 H/O ADULT VICTIM OF ABUSE: ICD-10-CM

## 2024-12-24 DIAGNOSIS — Z87.59 HISTORY OF PRIOR PREGNANCY WITH IUGR NEWBORN: ICD-10-CM

## 2024-12-24 DIAGNOSIS — Z34.83 MULTIGRAVIDA IN THIRD TRIMESTER: Primary | ICD-10-CM

## 2024-12-24 DIAGNOSIS — Z91.51 HISTORY OF SUICIDE ATTEMPT: ICD-10-CM

## 2024-12-24 PROCEDURE — 87653 STREP B DNA AMP PROBE: CPT | Performed by: ADVANCED PRACTICE MIDWIFE

## 2024-12-24 PROCEDURE — 87591 N.GONORRHOEAE DNA AMP PROB: CPT | Performed by: ADVANCED PRACTICE MIDWIFE

## 2024-12-24 PROCEDURE — 99211 OFF/OP EST MAY X REQ PHY/QHP: CPT | Mod: PBBFAC,TH | Performed by: ADVANCED PRACTICE MIDWIFE

## 2024-12-24 PROCEDURE — 99999 PR PBB SHADOW E&M-EST. PATIENT-LVL I: CPT | Mod: PBBFAC,,, | Performed by: ADVANCED PRACTICE MIDWIFE
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
## 2024-12-24 NOTE — LETTER
December 24, 2024      [facility]  [address] [place] [address] 4TH FLOOR  [facility] [address]  Phone: [phone]  Fax: [phone]       Patient: Dagmar Quach   YOB: 2000  Date of Visit: 12/24/2024    To Whom It May Concern:    Natalia Quach  was at [facility] on 12/24/2024. The patient may return to work/school on 12/24/24 with no restrictions. If you have any questions or concerns, or if I can be of further assistance, please do not hesitate to contact me.    Sincerely,    Milady Rubio MA

## 2024-12-24 NOTE — LETTER
[facility]  [address] [place] [address] 4TH FLOOR  [facility] [address]  Phone: [phone]  Fax: [phone] December 24, 2024     Patient: Dagmar Quach   YOB: 2000   Date of Visit: 12/24/2024       To Whom It May Concern:    12/20/2024  Please excuse Ms. Quach as she wasn't feeling well on this pass Friday.   If you have any questions or concerns, please don't hesitate to contact my office.    Sincerely,        Andressa Rodríguez CNM

## 2024-12-25 LAB — GROUP B STREPTOCOCCUS, PCR: POSITIVE

## 2024-12-26 ENCOUNTER — PATIENT MESSAGE (OUTPATIENT)
Dept: OBSTETRICS AND GYNECOLOGY | Facility: CLINIC | Age: 24
End: 2024-12-26
Payer: MEDICAID

## 2024-12-26 PROBLEM — O99.820 GBS (GROUP B STREPTOCOCCUS CARRIER), +RV CULTURE, CURRENTLY PREGNANT: Status: ACTIVE | Noted: 2024-12-26

## 2024-12-26 LAB
C TRACH DNA SPEC QL NAA+PROBE: NOT DETECTED
N GONORRHOEA DNA SPEC QL NAA+PROBE: NOT DETECTED

## 2024-12-30 NOTE — PROGRESS NOTES
[facility] OUTPATIENT THERAPY AND WELLNESS   Physical Therapy Treatment Note      Name: Dagmar Quach  Clinic Number: [id number]    Therapy Diagnosis:   Encounter Diagnoses   Name Primary?    Pain of neck with recent traumatic injury Yes    Decreased range of motion of neck     Neck muscle weakness      Physician: Altagracia Bro CNM    Visit Date: 12/31/2024    Physician Orders: PT Eval and Treat   Medical Diagnosis from Referral: Pain of neck with recent traumatic injury [M54.2]   Evaluation Date: 12/18/2024  Authorization Period Expiration: 12/31/2024  Plan of Care Expiration: 2/1/2025  Visit # / Visits authorized: 1/ 6  FOTO 1st follow up:  FOTO 2nd follow up:    PTA Visit #: 0/5     Precautions: Standard    Time In: 4:30 pm  Time Out: 5:25 pm  Total Appointment Time: 55 minutes    Subjective     Patient reports: that she is still feeling mid back tightness.  She was compliant with home exercise program.  Response to previous treatment: first follow up  Functional change: ongoing    Pain:  Current 5/10, worst 8/10, best 4/10   Location: left cervical spine   Description: Aching, Dull, Grabbing, Tight, Deep, and Sharp  Aggravating Factors: Standing, Bending, Walking, Night Time, Morning, Extension, Flexing, Lifting, and Getting out of bed/chair  Easing Factors: pain medication, ice, lying down, heating pad, and rest     Pts goals: improve her pain and mobility over the next month prior to delivery    Objective    Asterisk Signs:   Observation: patient is in 3rd trimester of pregnancy     Posture: lordosis of the lumbar spine and increased kyphosis of the thoracic spine, hyperextension of the cervical spine      Cervical Range of Motion:     Limitation (%) Pain   Flexion 10% +      Extension 20% -      Right Rotation 0% -      Left Rotation 25% + Left       Right Side Bending 10% -   Left Side Bending 10% -      Upper Cervical Mobility Assessment    C0-C1 Flex normal normal   C0-C1 Ext normal normal   C0-C1  Sidebending limited normal   C1-2 Rotation normal Limited 25%   C1-3 Rotation normal normal      Shoulder Range of Motion:   Shoulder Left Right   Flexion 180 180   Abduction 180 180   ER 80 80   IR 75 75      Upper Extremity Strength  (R) UE   (L) UE     Shoulder flexion: 4/5 Shoulder flexion: 4/5   Shoulder Abduction: 4/5 Shoulder abduction: 4/5   Shoulder ER 4/5 Shoulder ER 4/5   Shoulder IR 4/5 Shoulder IR 4/5   Elbow flexion: 5/5 Elbow flexion: 5/5   Elbow extension: 5/5 Elbow extension: 5/5   Wrist flexion: 5/5 Wrist flexion: 5/5   Wrist extension: 5/5 Wrist extension: 5/5   Lower Trap 4-/5 Lower Trap 3+/5   Middle Trap 3+/5 Middle Trap 4-/5         Special Tests:  Distraction + relief   Spurlings -   Vertebral Artery -   Harvey -   Lateral Flexion Alar Ligament -   Transverse Ligament -   Shoulder Abduction Test -   Shoulder Offload Test + Left      Cervical joint mobility:   Hypomobility C4-C6 on Left       Reflexes (DTRs)  Upper Extremity  Right  Left    C5 - Bicep Reflex 2+ 2+   C6- Brachioradialis Reflex 2+ 2+   C7- Triceps Reflex 2+ 2+         Thoracic mobility: hypomobility T2-T6 bilaterally      Palpation: tenderness to palpation over medial scapular border adjacent to T4 segment at costovertebral angle       Sensation: normal     Flexibility: normal     Neural tension: +  -ULTT Median: + relevant Right   -ULTT Ulnar: -  -ULTT Radial: -          Objective Measures updated at progress report unless specified.     Treatment     Dagmar received the treatments listed below:      manual therapy techniques (billed as therex): Joint mobilizations were applied to the: Cervical/Thoracic Spine for 10 minutes, including:  Supine Thoracic HVLA  Left Cervical Opening Glides Grade III C5/6    Dagmar received therapeutic exercises to develop strength, endurance, ROM, flexibility, posture, and core stabilization for 43 minutes including:  Bridges 20x's   Clamshells GTB 30x's each   SNAGs with Towel 10x's   Side lying  Hip Abduction 20x's each Lower Extremity   Seated Paloff Press 20x's each   Seated Rows Red Band 30x's       Patient Education and Home Exercises       Education provided:   - Patient was provided education on for continued compliance with HEP for continued functional mobility and strength gains for return to prior level of function.      Written Home Exercises Provided: Patient instructed to cont prior HEP. Exercises were reviewed and Dagmar was able to demonstrate them prior to the end of the session.  Dagmar demonstrated good  understanding of the education provided. See Electronic Medical Record under Patient Instructions for exercises provided during therapy sessions    Assessment   Patient presents to the clinic with mild symptom irritability pre-session and minimal irritability post-session. Manual therapy was performed to thoracic/cervical spine to improve joint play and allow for optimal movement during corrective exercises.   Patient demonstrates improving upper back mobility.   Patient demonstrated improvements in hip abduction motor control in clamshell. within session.     Patient will continue to benefit from skilled outpatient physical therapy to address the deficits listed in the problem list box on initial evaluation, provide patient/family education and to maximize patient's level of independence in the home and community environment.     Dagmar Is progressing well towards her goals.   Patient prognosis is Good.     Patient's spiritual, cultural and educational needs considered and pt agreeable to plan of care and goals.     Anticipated barriers to physical therapy: Pregnancy     Goals:   Short Term Goals: 6 weeks  1.Report decreased neck pain  <   / =  2  /10  to increase tolerance for ADLs and work.  2. Increase cervical ROM by 5-10 degrees in order to perform ADLs with decreased difficulty.  3. Increase strength in B shoulders/scapular stabilizers by 1/3 MMT grade to increase tolerance for ADL and  work activities.  4. Pt to tolerate HEP to improve ROM and independence with ADL's     Long Term Goals: 12 weeks  1.Report decreased neck pain  <   / =  0  /10  to increase tolerance for lifting and carrying  2. Increase UE/neck flexibility in order to improve posture.    3.Increased strength in B shoulders/scapular stabilizers to >/= 4/5 MMT grade to increase tolerance for ADL and work activities.  4.  Pt will report level 38% impaired on FOTO neck score for neck pain disability to demonstrate decrease in disability and improvement in neck pain.        Plan   Plan of care Certification: 12/18/2024 to 2/1/2025.     Outpatient Physical Therapy 2 times weekly for 12 weeks to include the following interventions: Cervical/Lumbar Traction, Electrical Stimulation as needed, Gait Training, Manual Therapy, Moist Heat/ Ice, Neuromuscular Re-ed, Orthotic Management and Training, Patient Education, Self Care, Therapeutic Activities, and Therapeutic Exercise, Blood Flow Restriction Training, Trigger Point Dry Needling as needed.    Joao Matt PT, DPT  Board Certified Orthopaedic Clinical Specialist

## 2024-12-31 ENCOUNTER — ROUTINE PRENATAL (OUTPATIENT)
Dept: OBSTETRICS AND GYNECOLOGY | Facility: CLINIC | Age: 24
End: 2024-12-31
Payer: MEDICAID

## 2024-12-31 ENCOUNTER — CLINICAL SUPPORT (OUTPATIENT)
Dept: REHABILITATION | Facility: OTHER | Age: 24
End: 2024-12-31
Payer: MEDICAID

## 2024-12-31 VITALS
WEIGHT: 183 LBS | HEART RATE: 79 BPM | SYSTOLIC BLOOD PRESSURE: 105 MMHG | DIASTOLIC BLOOD PRESSURE: 59 MMHG | BODY MASS INDEX: 30.45 KG/M2

## 2024-12-31 DIAGNOSIS — O99.820 GBS (GROUP B STREPTOCOCCUS CARRIER), +RV CULTURE, CURRENTLY PREGNANT: ICD-10-CM

## 2024-12-31 DIAGNOSIS — M53.82 NECK MUSCLE WEAKNESS: ICD-10-CM

## 2024-12-31 DIAGNOSIS — Z34.83 MULTIGRAVIDA IN THIRD TRIMESTER: ICD-10-CM

## 2024-12-31 DIAGNOSIS — Z3A.37 37 WEEKS GESTATION OF PREGNANCY: Primary | ICD-10-CM

## 2024-12-31 DIAGNOSIS — Z86.59 HISTORY OF DEPRESSION: ICD-10-CM

## 2024-12-31 DIAGNOSIS — M54.2 PAIN OF NECK WITH RECENT TRAUMATIC INJURY: Primary | ICD-10-CM

## 2024-12-31 DIAGNOSIS — R29.898 DECREASED RANGE OF MOTION OF NECK: ICD-10-CM

## 2024-12-31 PROCEDURE — 99213 OFFICE O/P EST LOW 20 MIN: CPT | Mod: PBBFAC,TH

## 2024-12-31 PROCEDURE — 97110 THERAPEUTIC EXERCISES: CPT | Mod: PN

## 2024-12-31 PROCEDURE — 99999 PR PBB SHADOW E&M-EST. PATIENT-LVL III: CPT | Mod: PBBFAC,,,

## 2024-12-31 NOTE — PROGRESS NOTES
24 y.o.,  at 37w0d presents today for routine OB appt.  Denies regular UCs, LOF or VB. Reports good FM. Doing well without concerns.  TW lbs     PHYSICAL EXAM  GENERAL: No acute distress  HEENT: Normocephalic, atraumatic  NEURO: Alert and oriented x3  PULMONARY: Non-labored respiration; no tachypnea  ABD: Soft, gravid, nontender.    ASSESSMENT AND PLAN  37 weeks gestation of pregnancy    Multigravida in third trimester    GBS (group B Streptococcus carrier), +RV culture, currently pregnant    History of depression      Reviewed GBS positive and need for intrapartum abx  Reviewed repeat 3rd trimester HIV/trep - neg  Education regarding labor precautions.  Reviewed warning signs, normal FM, and how/when to call.      Follow-up: 1 week, call or present sooner PRN    Naomi Rg CNM/SOLITARIO

## 2025-01-06 ENCOUNTER — PROCEDURE VISIT (OUTPATIENT)
Dept: MATERNAL FETAL MEDICINE | Facility: CLINIC | Age: 25
End: 2025-01-06
Payer: MEDICAID

## 2025-01-06 DIAGNOSIS — Z3A.35 35 WEEKS GESTATION OF PREGNANCY: ICD-10-CM

## 2025-01-06 DIAGNOSIS — O26.00 EXCESSIVE WEIGHT GAIN AFFECTING PREGNANCY: ICD-10-CM

## 2025-01-06 DIAGNOSIS — Z87.59 HISTORY OF PRIOR PREGNANCY WITH IUGR NEWBORN: ICD-10-CM

## 2025-01-06 PROCEDURE — 76816 OB US FOLLOW-UP PER FETUS: CPT | Mod: PBBFAC | Performed by: OBSTETRICS & GYNECOLOGY

## 2025-01-07 NOTE — PROGRESS NOTES
[facility]   Physical Therapy Treatment Note      Name: Dagmar Quach  Clinic Number: [id number]    Therapy Diagnosis:   Encounter Diagnoses   Name Primary?    Pain of neck with recent traumatic injury Yes    Decreased range of motion of neck     Neck muscle weakness        Physician: Altagracia Bro CNM    Visit Date: 1/8/2025    Physician Orders: PT Eval and Treat   Medical Diagnosis from Referral: Pain of neck with recent traumatic injury [M54.2]   Evaluation Date: 12/18/2024  Authorization Period Expiration: 12/31/2024  Plan of Care Expiration: 2/1/2025  Visit # / Visits authorized: 1/ 23 (Updated)  FOTO 1st follow up: complete  FOTO 2nd follow up:    PTA Visit #: 0/5     Precautions: Standard    Time In: 9:40 am  Time Out: 10:35 am  Total Appointment Time: 55 minutes    Subjective     Patient reports: that she is doing well. She had OBGYN appt today and all is well.   She was compliant with home exercise program.  Response to previous treatment: improved mobility   Functional change: ongoing    Pain:  Current 5/10, worst 8/10, best 4/10   Location: left cervical spine   Description: Aching, Dull, Grabbing, Tight, Deep, and Sharp  Aggravating Factors: Standing, Bending, Walking, Night Time, Morning, Extension, Flexing, Lifting, and Getting out of bed/chair  Easing Factors: pain medication, ice, lying down, heating pad, and rest     Pts goals: improve her pain and mobility over the next month prior to delivery    Objective    Asterisk Signs:   Observation: patient is in 3rd trimester of pregnancy     Posture: lordosis of the lumbar spine and increased kyphosis of the thoracic spine, hyperextension of the cervical spine      Cervical Range of Motion:     Limitation (%) Pain   Flexion 10% +      Extension 20% -      Right Rotation 0% -      Left Rotation 25% + Left       Right Side Bending 10% -   Left Side Bending 10% -      Upper Cervical Mobility Assessment    C0-C1 Flex normal  normal   C0-C1 Ext normal normal   C0-C1 Sidebending limited normal   C1-2 Rotation normal Limited 25%   C1-3 Rotation normal normal      Shoulder Range of Motion:   Shoulder Left Right   Flexion 180 180   Abduction 180 180   ER 80 80   IR 75 75      Upper Extremity Strength  (R) UE   (L) UE     Shoulder flexion: 4/5 Shoulder flexion: 4/5   Shoulder Abduction: 4/5 Shoulder abduction: 4/5   Shoulder ER 4/5 Shoulder ER 4/5   Shoulder IR 4/5 Shoulder IR 4/5   Elbow flexion: 5/5 Elbow flexion: 5/5   Elbow extension: 5/5 Elbow extension: 5/5   Wrist flexion: 5/5 Wrist flexion: 5/5   Wrist extension: 5/5 Wrist extension: 5/5   Lower Trap 4-/5 Lower Trap 3+/5   Middle Trap 3+/5 Middle Trap 4-/5         Special Tests:  Distraction + relief   Spurlings -   Vertebral Artery -   Harvey -   Lateral Flexion Alar Ligament -   Transverse Ligament -   Shoulder Abduction Test -   Shoulder Offload Test + Left      Cervical joint mobility:   Hypomobility C4-C6 on Left       Reflexes (DTRs)  Upper Extremity  Right  Left    C5 - Bicep Reflex 2+ 2+   C6- Brachioradialis Reflex 2+ 2+   C7- Triceps Reflex 2+ 2+         Thoracic mobility: hypomobility T2-T6 bilaterally      Palpation: tenderness to palpation over medial scapular border adjacent to T4 segment at costovertebral angle       Sensation: normal     Flexibility: normal     Neural tension: +  -ULTT Median: + relevant Right   -ULTT Ulnar: -  -ULTT Radial: -          Objective Measures updated at progress report unless specified.     Treatment     Dagmar received the treatments listed below:      manual therapy techniques (billed as therex): Joint mobilizations were applied to the: Cervical/Thoracic Spine for 10 minutes, including:  Supine Thoracic HVLA  Left Cervical Opening Glides Grade III C5/6    Dagmar received therapeutic exercises to develop strength, endurance, ROM, flexibility, posture, and core stabilization for 43 minutes including:  Bridges 20x's   Clamshells GTB 30x's  each   SNAGs with Towel 10x's   Side lying Hip Abduction 20x's each Lower Extremity   Seated Paloff Press 20x's each   Seated Rows Red Band 30x's   Shrugs 10 x 5'' hold   Seated Thoracic Extension over Foam Roller 15x's       Patient Education and Home Exercises       Education provided:   - Patient was provided education on for continued compliance with HEP for continued functional mobility and strength gains for return to prior level of function.      Written Home Exercises Provided: Patient instructed to cont prior HEP. Exercises were reviewed and Dagmar was able to demonstrate them prior to the end of the session.  Dagmar demonstrated good  understanding of the education provided. See Electronic Medical Record under Patient Instructions for exercises provided during therapy sessions    Assessment   Patient presents to the clinic with mild to moderate symptom irritability pre-session and mild irritability post-session. Manual therapy was performed to thoracic spine to improve joint play and allow for optimal movement during corrective exercises.   Patient demonstrates neck motor control and thoracic mobility deficits.   Patient demonstrated improvements in neck motor control within session.   Patient will continue to benefit from skilled outpatient physical therapy to address the deficits listed in the problem list box on initial evaluation, provide patient/family education and to maximize patient's level of independence in the home and community environment.     Dagmar Is progressing well towards her goals.   Patient prognosis is Good.     Patient's spiritual, cultural and educational needs considered and pt agreeable to plan of care and goals.     Anticipated barriers to physical therapy: Pregnancy     Goals:   Short Term Goals: 6 weeks  1.Report decreased neck pain  <   / =  2  /10  to increase tolerance for ADLs and work.  2. Increase cervical ROM by 5-10 degrees in order to perform ADLs with decreased  difficulty. MET  3. Increase strength in B shoulders/scapular stabilizers by 1/3 MMT grade to increase tolerance for ADL and work activities.  4. Pt to tolerate HEP to improve ROM and independence with ADL's MET     Long Term Goals: 12 weeks  1.Report decreased neck pain  <   / =  0  /10  to increase tolerance for lifting and carrying  2. Increase UE/neck flexibility in order to improve posture.    3.Increased strength in B shoulders/scapular stabilizers to >/= 4/5 MMT grade to increase tolerance for ADL and work activities.  4.  Pt will report level 38% impaired on FOTO neck score for neck pain disability to demonstrate decrease in disability and improvement in neck pain.        Plan   Plan of care Certification: 12/18/2024 to 2/1/2025.     Outpatient Physical Therapy 2 times weekly for 12 weeks to include the following interventions: Cervical/Lumbar Traction, Electrical Stimulation as needed, Gait Training, Manual Therapy, Moist Heat/ Ice, Neuromuscular Re-ed, Orthotic Management and Training, Patient Education, Self Care, Therapeutic Activities, and Therapeutic Exercise, Blood Flow Restriction Training, Trigger Point Dry Needling as needed.    Joao Matt PT, DPT  Board Certified Orthopaedic Clinical Specialist

## 2025-01-08 ENCOUNTER — ROUTINE PRENATAL (OUTPATIENT)
Dept: OBSTETRICS AND GYNECOLOGY | Facility: CLINIC | Age: 25
End: 2025-01-08
Payer: MEDICAID

## 2025-01-08 ENCOUNTER — CLINICAL SUPPORT (OUTPATIENT)
Dept: REHABILITATION | Facility: OTHER | Age: 25
End: 2025-01-08
Payer: MEDICAID

## 2025-01-08 ENCOUNTER — PATIENT MESSAGE (OUTPATIENT)
Dept: REHABILITATION | Facility: OTHER | Age: 25
End: 2025-01-08

## 2025-01-08 VITALS
DIASTOLIC BLOOD PRESSURE: 73 MMHG | SYSTOLIC BLOOD PRESSURE: 107 MMHG | HEART RATE: 87 BPM | BODY MASS INDEX: 30.38 KG/M2 | WEIGHT: 182.56 LBS

## 2025-01-08 DIAGNOSIS — Z86.59 HISTORY OF DEPRESSION: Primary | ICD-10-CM

## 2025-01-08 DIAGNOSIS — Z3A.38 38 WEEKS GESTATION OF PREGNANCY: ICD-10-CM

## 2025-01-08 DIAGNOSIS — M53.82 NECK MUSCLE WEAKNESS: ICD-10-CM

## 2025-01-08 DIAGNOSIS — R29.898 DECREASED RANGE OF MOTION OF NECK: ICD-10-CM

## 2025-01-08 DIAGNOSIS — O99.820 GBS (GROUP B STREPTOCOCCUS CARRIER), +RV CULTURE, CURRENTLY PREGNANT: ICD-10-CM

## 2025-01-08 DIAGNOSIS — M54.2 PAIN OF NECK WITH RECENT TRAUMATIC INJURY: Primary | ICD-10-CM

## 2025-01-08 PROCEDURE — 99212 OFFICE O/P EST SF 10 MIN: CPT | Mod: PBBFAC,TH | Performed by: ADVANCED PRACTICE MIDWIFE

## 2025-01-08 PROCEDURE — 97110 THERAPEUTIC EXERCISES: CPT | Mod: PN

## 2025-01-08 PROCEDURE — 99999 PR PBB SHADOW E&M-EST. PATIENT-LVL II: CPT | Mod: PBBFAC,,, | Performed by: ADVANCED PRACTICE MIDWIFE

## 2025-01-08 NOTE — PROGRESS NOTES
24 y.o.,  at 38w1d presents today for routine OB appt.  Denies HA, visual disturbances, or upper abdominal pain/GI complaints. No regular UCs intermittent and occasional, denies LOF or VB. Reports good FM  Doing well with NO concerns.  Tw #  PHYSICAL EXAM  GENERAL: No acute distress  HEENT: Normocephalic, atraumatic  NEURO: Alert and oriented x3  PULMONARY: Non-labored respiration; no tachypnea  ABD: Soft, gravid, nontender.    ASSESSMENT AND PLAN  History of depression    GBS (group B Streptococcus carrier), +RV culture, currently pregnant    38 weeks gestation of pregnancy    Birth Center Risk Assessment: 0- Meets birth center guidelines    CNM management in [facility]  CNM management on L&D  Consultation with OB to develop  plan of care  Collaborative CNM/OB management with delivery on L&D  Permanent referral of care to MD   Is aware GBS +   Delivery consents PREV signed  Discussed plans for support people during labor - patient plans to have ncb, Lakhwinder Posada.  Reviewed warning signs, normal FM, labor precautions, and how/when to call.      Follow-up: 1 week, call or present sooner HENRY Bro, ANNE, APRN

## 2025-01-15 ENCOUNTER — CLINICAL SUPPORT (OUTPATIENT)
Dept: REHABILITATION | Facility: OTHER | Age: 25
End: 2025-01-15
Payer: MEDICAID

## 2025-01-15 DIAGNOSIS — M54.2 PAIN OF NECK WITH RECENT TRAUMATIC INJURY: Primary | ICD-10-CM

## 2025-01-15 DIAGNOSIS — R29.898 DECREASED RANGE OF MOTION OF NECK: ICD-10-CM

## 2025-01-15 DIAGNOSIS — M53.82 NECK MUSCLE WEAKNESS: ICD-10-CM

## 2025-01-15 PROCEDURE — 97110 THERAPEUTIC EXERCISES: CPT | Mod: PN

## 2025-01-15 NOTE — PROGRESS NOTES
[facility]   Physical Therapy Treatment Note      Name: Dagmar Quach  Clinic Number: [id number]    Therapy Diagnosis:   Encounter Diagnoses   Name Primary?    Pain of neck with recent traumatic injury Yes    Decreased range of motion of neck     Neck muscle weakness          Physician: Altagracia Bro CNM    Visit Date: 1/15/2025    Physician Orders: PT Eval and Treat   Medical Diagnosis from Referral: Pain of neck with recent traumatic injury [M54.2]   Evaluation Date: 12/18/2024  Authorization Period Expiration: 12/31/2024  Plan of Care Expiration: 2/1/2025  Visit # / Visits authorized: 2/ 23 (Updated)  FOTO 1st follow up: complete  FOTO 2nd follow up: complete    PTA Visit #: 0/5     Precautions: Standard    Time In: 10:00 am  Time Out: 10:53 am  Total Appointment Time: 53 minutes    Subjective     Patient reports: that she is doing well. 39 weeks gestation today.   She was compliant with home exercise program.  Response to previous treatment: improved mobility   Functional change: ongoing    Pain:  Current 5/10, worst 8/10, best 4/10   Location: left cervical spine   Description: Aching, Dull, Grabbing, Tight, Deep, and Sharp  Aggravating Factors: Standing, Bending, Walking, Night Time, Morning, Extension, Flexing, Lifting, and Getting out of bed/chair  Easing Factors: pain medication, ice, lying down, heating pad, and rest     Pts goals: improve her pain and mobility over the next month prior to delivery    Objective    Asterisk Signs:   Observation: patient is in 3rd trimester of pregnancy     Posture: lordosis of the lumbar spine and increased kyphosis of the thoracic spine, hyperextension of the cervical spine      Cervical Range of Motion:     Limitation (%) Pain   Flexion 10% +      Extension 20% -      Right Rotation 0% -      Left Rotation 25% + Left       Right Side Bending 10% -   Left Side Bending 10% -      Upper Cervical Mobility Assessment    C0-C1 Flex normal normal    C0-C1 Ext normal normal   C0-C1 Sidebending limited normal   C1-2 Rotation normal Limited 25%   C1-3 Rotation normal normal      Shoulder Range of Motion:   Shoulder Left Right   Flexion 180 180   Abduction 180 180   ER 80 80   IR 75 75      Upper Extremity Strength  (R) UE   (L) UE     Shoulder flexion: 4/5 Shoulder flexion: 4/5   Shoulder Abduction: 4/5 Shoulder abduction: 4/5   Shoulder ER 4/5 Shoulder ER 4/5   Shoulder IR 4/5 Shoulder IR 4/5   Elbow flexion: 5/5 Elbow flexion: 5/5   Elbow extension: 5/5 Elbow extension: 5/5   Wrist flexion: 5/5 Wrist flexion: 5/5   Wrist extension: 5/5 Wrist extension: 5/5   Lower Trap 4-/5 Lower Trap 3+/5   Middle Trap 3+/5 Middle Trap 4-/5         Special Tests:  Distraction + relief   Spurlings -   Vertebral Artery -   Harvey -   Lateral Flexion Alar Ligament -   Transverse Ligament -   Shoulder Abduction Test -   Shoulder Offload Test + Left      Cervical joint mobility:   Hypomobility C4-C6 on Left       Reflexes (DTRs)  Upper Extremity  Right  Left    C5 - Bicep Reflex 2+ 2+   C6- Brachioradialis Reflex 2+ 2+   C7- Triceps Reflex 2+ 2+         Thoracic mobility: hypomobility T2-T6 bilaterally      Palpation: tenderness to palpation over medial scapular border adjacent to T4 segment at costovertebral angle       Sensation: normal     Flexibility: normal     Neural tension: +  -ULTT Median: + relevant Right   -ULTT Ulnar: -  -ULTT Radial: -          Objective Measures updated at progress report unless specified.     Treatment     Dagmar received the treatments listed below:      manual therapy techniques (billed as therex): Joint mobilizations were applied to the: Cervical/Thoracic Spine for 10 minutes, including:  Supine Thoracic HVLA  Left Cervical Opening Glides Grade III C5/6    Dagmar received therapeutic exercises to develop strength, endurance, ROM, flexibility, posture, and core stabilization for 43 minutes including:  No Monies Red Band 20x's   SNAGs with Towel  10x's   Side lying Hip Abduction 20x's each Lower Extremity   Seated Paloff Press 20x's each   Seated Rows Red Band 30x's   Shrugs 10 x 5'' hold   Seated Thoracic Extension over Foam Roller 15x's   Bicep Curls 3# DB       Patient Education and Home Exercises       Education provided:   - Patient was provided education on for continued compliance with HEP for continued functional mobility and strength gains for return to prior level of function.      Written Home Exercises Provided: Patient instructed to cont prior HEP. Exercises were reviewed and Dagmar was able to demonstrate them prior to the end of the session.  Dagmar demonstrated good  understanding of the education provided. See Electronic Medical Record under Patient Instructions for exercises provided during therapy sessions    Assessment   Patient presents to the clinic with mild to moderate symptom irritability pre-session and mild irritability post-session. Manual therapy was performed to thoracic spine to improve joint play and allow for optimal movement during corrective exercises.   Patient demonstrates neck motor control and thoracic mobility deficits.   Patient demonstrated improvements in neck motor control within session.   Patient experienced mild contractions during session, induced next week for delivery.   Patient will continue to benefit from skilled outpatient physical therapy to address the deficits listed in the problem list box on initial evaluation, provide patient/family education and to maximize patient's level of independence in the home and community environment.     Dagmar Is progressing well towards her goals.   Patient prognosis is Good.     Patient's spiritual, cultural and educational needs considered and pt agreeable to plan of care and goals.     Anticipated barriers to physical therapy: Pregnancy     Goals:   Short Term Goals: 6 weeks  1.Report decreased neck pain  <   / =  2  /10  to increase tolerance for ADLs and work. MET  2.  Increase cervical ROM by 5-10 degrees in order to perform ADLs with decreased difficulty. MET  3. Increase strength in B shoulders/scapular stabilizers by 1/3 MMT grade to increase tolerance for ADL and work activities. MET  4. Pt to tolerate HEP to improve ROM and independence with ADL's  MET     Long Term Goals: 12 weeks  1.Report decreased neck pain  <   / =  0  /10  to increase tolerance for lifting and carrying  2. Increase UE/neck flexibility in order to improve posture.    3.Increased strength in B shoulders/scapular stabilizers to >/= 4/5 MMT grade to increase tolerance for ADL and work activities.  4.  Pt will report level 38% impaired on FOTO neck score for neck pain disability to demonstrate decrease in disability and improvement in neck pain.        Plan   Plan of care Certification: 12/18/2024 to 2/1/2025.    Follow Up after delivery.      Outpatient Physical Therapy 2 times weekly for 12 weeks to include the following interventions: Cervical/Lumbar Traction, Electrical Stimulation as needed, Gait Training, Manual Therapy, Moist Heat/ Ice, Neuromuscular Re-ed, Orthotic Management and Training, Patient Education, Self Care, Therapeutic Activities, and Therapeutic Exercise, Blood Flow Restriction Training, Trigger Point Dry Needling as needed.    Joao Mtat PT, DPT  Board Certified Orthopaedic Clinical Specialist

## 2025-01-17 ENCOUNTER — ROUTINE PRENATAL (OUTPATIENT)
Dept: OBSTETRICS AND GYNECOLOGY | Facility: CLINIC | Age: 25
End: 2025-01-17
Payer: MEDICAID

## 2025-01-17 VITALS
SYSTOLIC BLOOD PRESSURE: 116 MMHG | BODY MASS INDEX: 30.96 KG/M2 | WEIGHT: 186.06 LBS | DIASTOLIC BLOOD PRESSURE: 68 MMHG | HEART RATE: 96 BPM

## 2025-01-17 DIAGNOSIS — O99.820 GBS (GROUP B STREPTOCOCCUS CARRIER), +RV CULTURE, CURRENTLY PREGNANT: ICD-10-CM

## 2025-01-17 DIAGNOSIS — Z87.59 HISTORY OF PRIOR PREGNANCY WITH IUGR NEWBORN: ICD-10-CM

## 2025-01-17 DIAGNOSIS — Z3A.39 39 WEEKS GESTATION OF PREGNANCY: Primary | ICD-10-CM

## 2025-01-17 DIAGNOSIS — Z86.59 HISTORY OF DEPRESSION: ICD-10-CM

## 2025-01-17 PROCEDURE — 99213 OFFICE O/P EST LOW 20 MIN: CPT | Mod: TH,S$PBB,,

## 2025-01-17 PROCEDURE — 99999 PR PBB SHADOW E&M-EST. PATIENT-LVL II: CPT | Mod: PBBFAC,,,

## 2025-01-17 PROCEDURE — 99212 OFFICE O/P EST SF 10 MIN: CPT | Mod: PBBFAC,TH

## 2025-01-17 NOTE — PROGRESS NOTES
24 y.o.,  at 39w3d presents today for routine OB appt.  Denies HA, visual disturbances, or upper abdominal pain/GI complaints. Denies LOF or VB. Reports good FM    Having contractions for past 2 days. Feeling them every 6-7 minutes apart now. Sex last night, feels she may have a laceration on vagina.     PHYSICAL EXAM  GENERAL: No acute distress  HEENT: Normocephalic, atraumatic  NEURO: Alert and oriented x3  PULMONARY: Non-labored respiration; no tachypnea  ABD: Soft, gravid, nontender.  PELVIC: Small abrasion/skid ethel at opening of vagina.     ASSESSMENT AND PLAN  39 weeks gestation of pregnancy    History of depression  Comments:  Hx suicide attempt 2019  No issues with depression for several years  2 week PP mood check    GBS (group B Streptococcus carrier), +RV culture, currently pregnant  Comments:  PCN labor    History of prior pregnancy with IUGR   Comments:  Growth @ 37w6d= EFW 73%, 3526 grams, AC 96%      Desires SVE today d/t contractions. SVE= Closed, 60%, -3  Recommend using lube and slow penetration with sex.   Planning unmedicated, tub delivery (went un-medicated with last baby).   Open to IOL @ 41 weeks of baby does not come.   Reviewed warning signs, normal FM, labor precautions, and how/when to call.   **Discussed [facility] triage protocol including initial assessment will be done by OBGYN MD prior to CNM admission.      Follow-up: 1 week, call or present sooner HENRY Mendez CNM, SOLITARIO

## 2025-01-18 ENCOUNTER — PATIENT MESSAGE (OUTPATIENT)
Dept: OBSTETRICS AND GYNECOLOGY | Facility: CLINIC | Age: 25
End: 2025-01-18

## 2025-01-18 ENCOUNTER — ANESTHESIA (OUTPATIENT)
Dept: OBSTETRICS AND GYNECOLOGY | Facility: OTHER | Age: 25
End: 2025-01-18
Payer: MEDICAID

## 2025-01-18 ENCOUNTER — HOSPITAL ENCOUNTER (INPATIENT)
Facility: OTHER | Age: 25
LOS: 2 days | Discharge: HOME | End: 2025-01-20
Attending: OBSTETRICS & GYNECOLOGY | Admitting: OBSTETRICS & GYNECOLOGY
Payer: MEDICAID

## 2025-01-18 ENCOUNTER — ANESTHESIA EVENT (OUTPATIENT)
Dept: OBSTETRICS AND GYNECOLOGY | Facility: OTHER | Age: 25
End: 2025-01-18
Payer: MEDICAID

## 2025-01-18 DIAGNOSIS — Z3A.39 39 WEEKS GESTATION OF PREGNANCY: ICD-10-CM

## 2025-01-18 DIAGNOSIS — Z37.9 NORMAL LABOR: ICD-10-CM

## 2025-01-18 DIAGNOSIS — O47.9 UTERINE CONTRACTIONS DURING PREGNANCY: Primary | ICD-10-CM

## 2025-01-18 PROBLEM — O99.820 GBS (GROUP B STREPTOCOCCUS CARRIER), +RV CULTURE, CURRENTLY PREGNANT: Status: RESOLVED | Noted: 2024-12-26 | Resolved: 2025-01-18

## 2025-01-18 LAB
ABO + RH BLD: NORMAL
BASOPHILS # BLD AUTO: 0.04 K/UL (ref 0–0.2)
BASOPHILS NFR BLD: 0.3 % (ref 0–1.9)
BLD GP AB SCN CELLS X3 SERPL QL: NORMAL
DIFFERENTIAL METHOD BLD: ABNORMAL
EOSINOPHIL # BLD AUTO: 0 K/UL (ref 0–0.5)
EOSINOPHIL NFR BLD: 0.3 % (ref 0–8)
ERYTHROCYTE [DISTWIDTH] IN BLOOD BY AUTOMATED COUNT: 13.8 % (ref 11.5–14.5)
HCT VFR BLD AUTO: 36.8 % (ref 37–48.5)
HGB BLD-MCNC: 12.6 G/DL (ref 12–16)
IMM GRANULOCYTES # BLD AUTO: 0.15 K/UL (ref 0–0.04)
IMM GRANULOCYTES NFR BLD AUTO: 1.2 % (ref 0–0.5)
LYMPHOCYTES # BLD AUTO: 2.9 K/UL (ref 1–4.8)
LYMPHOCYTES NFR BLD: 22.4 % (ref 18–48)
MCH RBC QN AUTO: 30.3 PG (ref 27–31)
MCHC RBC AUTO-ENTMCNC: 34.2 G/DL (ref 32–36)
MCV RBC AUTO: 89 FL (ref 82–98)
MONOCYTES # BLD AUTO: 1.2 K/UL (ref 0.3–1)
MONOCYTES NFR BLD: 9.7 % (ref 4–15)
NEUTROPHILS # BLD AUTO: 8.5 K/UL (ref 1.8–7.7)
NEUTROPHILS NFR BLD: 66.1 % (ref 38–73)
NRBC BLD-RTO: 0 /100 WBC
PLATELET # BLD AUTO: 203 K/UL (ref 150–450)
PMV BLD AUTO: 12.3 FL (ref 9.2–12.9)
RBC # BLD AUTO: 4.16 M/UL (ref 4–5.4)
SPECIMEN OUTDATE: NORMAL
TREPONEMA PALLIDUM IGG+IGM AB [PRESENCE] IN SERUM OR PLASMA BY IMMUNOASSAY: NONREACTIVE
WBC # BLD AUTO: 12.83 K/UL (ref 3.9–12.7)

## 2025-01-18 PROCEDURE — 25000003 PHARM REV CODE 250

## 2025-01-18 PROCEDURE — 51701 INSERT BLADDER CATHETER: CPT

## 2025-01-18 PROCEDURE — 99285 EMERGENCY DEPT VISIT HI MDM: CPT | Mod: 25

## 2025-01-18 PROCEDURE — C1751 CATH, INF, PER/CENT/MIDLINE: HCPCS | Performed by: ANESTHESIOLOGY

## 2025-01-18 PROCEDURE — 27200710 HC EPIDURAL INFUSION PUMP SET: Performed by: ANESTHESIOLOGY

## 2025-01-18 PROCEDURE — 4A1HXCZ MONITORING OF PRODUCTS OF CONCEPTION, CARDIAC RATE, EXTERNAL APPROACH: ICD-10-PCS | Performed by: OBSTETRICS & GYNECOLOGY

## 2025-01-18 PROCEDURE — 85025 COMPLETE CBC W/AUTO DIFF WBC: CPT

## 2025-01-18 PROCEDURE — 0HQ9XZZ REPAIR PERINEUM SKIN, EXTERNAL APPROACH: ICD-10-PCS | Performed by: OBSTETRICS & GYNECOLOGY

## 2025-01-18 PROCEDURE — 76815 OB US LIMITED FETUS(S): CPT | Mod: 26,,, | Performed by: OBSTETRICS & GYNECOLOGY

## 2025-01-18 PROCEDURE — 59409 OBSTETRICAL CARE: CPT | Mod: GB,,,

## 2025-01-18 PROCEDURE — 86901 BLOOD TYPING SEROLOGIC RH(D): CPT

## 2025-01-18 PROCEDURE — 63600175 PHARM REV CODE 636 W HCPCS

## 2025-01-18 PROCEDURE — 59025 FETAL NON-STRESS TEST: CPT | Mod: 26,,, | Performed by: OBSTETRICS & GYNECOLOGY

## 2025-01-18 PROCEDURE — 86762 RUBELLA ANTIBODY: CPT

## 2025-01-18 PROCEDURE — 99285 EMERGENCY DEPT VISIT HI MDM: CPT | Mod: 25,,, | Performed by: OBSTETRICS & GYNECOLOGY

## 2025-01-18 PROCEDURE — 59025 FETAL NON-STRESS TEST: CPT

## 2025-01-18 PROCEDURE — 51702 INSERT TEMP BLADDER CATH: CPT

## 2025-01-18 PROCEDURE — 86593 SYPHILIS TEST NON-TREP QUANT: CPT

## 2025-01-18 PROCEDURE — 62326 NJX INTERLAMINAR LMBR/SAC: CPT

## 2025-01-18 PROCEDURE — 51798 US URINE CAPACITY MEASURE: CPT

## 2025-01-18 PROCEDURE — 72100002 HC LABOR CARE, 1ST 8 HOURS

## 2025-01-18 PROCEDURE — 59409 OBSTETRICAL CARE: CPT | Mod: AA,,, | Performed by: ANESTHESIOLOGY

## 2025-01-18 PROCEDURE — 11000001 HC ACUTE MED/SURG PRIVATE ROOM

## 2025-01-18 PROCEDURE — 72200004 HC VAGINAL DELIVERY LEVEL I

## 2025-01-18 RX ORDER — OXYTOCIN 10 [USP'U]/ML
10 INJECTION, SOLUTION INTRAMUSCULAR; INTRAVENOUS ONCE AS NEEDED
Status: COMPLETED | OUTPATIENT
Start: 2025-01-18 | End: 2025-01-18

## 2025-01-18 RX ORDER — CALCIUM CARBONATE 200(500)MG
500 TABLET,CHEWABLE ORAL 3 TIMES DAILY PRN
Status: DISCONTINUED | OUTPATIENT
Start: 2025-01-18 | End: 2025-01-20 | Stop reason: HOSPADM

## 2025-01-18 RX ORDER — OXYTOCIN-SODIUM CHLORIDE 0.9% IV SOLN 30 UNIT/500ML 30-0.9/5 UT/ML-%
95 SOLUTION INTRAVENOUS CONTINUOUS PRN
Status: DISCONTINUED | OUTPATIENT
Start: 2025-01-18 | End: 2025-01-18

## 2025-01-18 RX ORDER — FENTANYL/BUPIVACAINE/NS/PF 2MCG/ML-.1
PLASTIC BAG, INJECTION (ML) INJECTION
Status: COMPLETED
Start: 2025-01-18 | End: 2025-01-18

## 2025-01-18 RX ORDER — TRANEXAMIC ACID 10 MG/ML
1000 INJECTION, SOLUTION INTRAVENOUS EVERY 30 MIN PRN
Status: DISCONTINUED | OUTPATIENT
Start: 2025-01-18 | End: 2025-01-20 | Stop reason: HOSPADM

## 2025-01-18 RX ORDER — LIDOCAINE HYDROCHLORIDE AND EPINEPHRINE 15; 5 MG/ML; UG/ML
INJECTION, SOLUTION EPIDURAL
Status: DISCONTINUED | OUTPATIENT
Start: 2025-01-18 | End: 2025-01-18

## 2025-01-18 RX ORDER — CARBOPROST TROMETHAMINE 250 UG/ML
250 INJECTION, SOLUTION INTRAMUSCULAR
Status: DISCONTINUED | OUTPATIENT
Start: 2025-01-18 | End: 2025-01-18

## 2025-01-18 RX ORDER — SODIUM CHLORIDE 0.9 % (FLUSH) 0.9 %
10 SYRINGE (ML) INJECTION
Status: DISCONTINUED | OUTPATIENT
Start: 2025-01-18 | End: 2025-01-20 | Stop reason: HOSPADM

## 2025-01-18 RX ORDER — OXYTOCIN-SODIUM CHLORIDE 0.9% IV SOLN 30 UNIT/500ML 30-0.9/5 UT/ML-%
95 SOLUTION INTRAVENOUS CONTINUOUS PRN
Status: DISCONTINUED | OUTPATIENT
Start: 2025-01-18 | End: 2025-01-20 | Stop reason: HOSPADM

## 2025-01-18 RX ORDER — IBUPROFEN 600 MG/1
600 TABLET ORAL EVERY 6 HOURS PRN
Status: DISCONTINUED | OUTPATIENT
Start: 2025-01-18 | End: 2025-01-20 | Stop reason: HOSPADM

## 2025-01-18 RX ORDER — ONDANSETRON 8 MG/1
8 TABLET, ORALLY DISINTEGRATING ORAL EVERY 8 HOURS PRN
Status: DISCONTINUED | OUTPATIENT
Start: 2025-01-18 | End: 2025-01-20 | Stop reason: HOSPADM

## 2025-01-18 RX ORDER — MISOPROSTOL 200 UG/1
800 TABLET ORAL ONCE AS NEEDED
Status: DISCONTINUED | OUTPATIENT
Start: 2025-01-18 | End: 2025-01-18

## 2025-01-18 RX ORDER — SODIUM CITRATE AND CITRIC ACID MONOHYDRATE 334; 500 MG/5ML; MG/5ML
30 SOLUTION ORAL
Status: CANCELLED | OUTPATIENT
Start: 2025-01-18 | End: 2025-01-18

## 2025-01-18 RX ORDER — OXYTOCIN-SODIUM CHLORIDE 0.9% IV SOLN 30 UNIT/500ML 30-0.9/5 UT/ML-%
95 SOLUTION INTRAVENOUS ONCE AS NEEDED
Status: DISCONTINUED | OUTPATIENT
Start: 2025-01-18 | End: 2025-01-18

## 2025-01-18 RX ORDER — OXYTOCIN-SODIUM CHLORIDE 0.9% IV SOLN 30 UNIT/500ML 30-0.9/5 UT/ML-%
95 SOLUTION INTRAVENOUS ONCE AS NEEDED
Status: DISCONTINUED | OUTPATIENT
Start: 2025-01-18 | End: 2025-01-20 | Stop reason: HOSPADM

## 2025-01-18 RX ORDER — DIPHENHYDRAMINE HCL 25 MG
25 CAPSULE ORAL EVERY 4 HOURS PRN
Status: DISCONTINUED | OUTPATIENT
Start: 2025-01-18 | End: 2025-01-20 | Stop reason: HOSPADM

## 2025-01-18 RX ORDER — DIPHENHYDRAMINE HYDROCHLORIDE 50 MG/ML
25 INJECTION INTRAMUSCULAR; INTRAVENOUS ONCE
Status: COMPLETED | OUTPATIENT
Start: 2025-01-18 | End: 2025-01-18

## 2025-01-18 RX ORDER — SODIUM CHLORIDE, SODIUM LACTATE, POTASSIUM CHLORIDE, CALCIUM CHLORIDE 600; 310; 30; 20 MG/100ML; MG/100ML; MG/100ML; MG/100ML
INJECTION, SOLUTION INTRAVENOUS CONTINUOUS
Status: DISCONTINUED | OUTPATIENT
Start: 2025-01-18 | End: 2025-01-19

## 2025-01-18 RX ORDER — METHYLERGONOVINE MALEATE 0.2 MG/ML
200 INJECTION INTRAVENOUS ONCE AS NEEDED
Status: DISCONTINUED | OUTPATIENT
Start: 2025-01-18 | End: 2025-01-18

## 2025-01-18 RX ORDER — LIDOCAINE HYDROCHLORIDE 10 MG/ML
10 INJECTION, SOLUTION INFILTRATION; PERINEURAL ONCE AS NEEDED
Status: DISCONTINUED | OUTPATIENT
Start: 2025-01-18 | End: 2025-01-20 | Stop reason: HOSPADM

## 2025-01-18 RX ORDER — FENTANYL/BUPIVACAINE/NS/PF 2MCG/ML-.1
PLASTIC BAG, INJECTION (ML) INJECTION CONTINUOUS PRN
Status: DISCONTINUED | OUTPATIENT
Start: 2025-01-18 | End: 2025-01-18

## 2025-01-18 RX ORDER — OXYTOCIN-SODIUM CHLORIDE 0.9% IV SOLN 30 UNIT/500ML 30-0.9/5 UT/ML-%
10 SOLUTION INTRAVENOUS ONCE AS NEEDED
Status: DISCONTINUED | OUTPATIENT
Start: 2025-01-18 | End: 2025-01-20 | Stop reason: HOSPADM

## 2025-01-18 RX ORDER — METHYLERGONOVINE MALEATE 0.2 MG/ML
200 INJECTION INTRAVENOUS ONCE AS NEEDED
Status: DISCONTINUED | OUTPATIENT
Start: 2025-01-18 | End: 2025-01-20 | Stop reason: HOSPADM

## 2025-01-18 RX ORDER — SIMETHICONE 80 MG
1 TABLET,CHEWABLE ORAL 4 TIMES DAILY PRN
Status: DISCONTINUED | OUTPATIENT
Start: 2025-01-18 | End: 2025-01-18

## 2025-01-18 RX ORDER — OXYTOCIN-SODIUM CHLORIDE 0.9% IV SOLN 30 UNIT/500ML 30-0.9/5 UT/ML-%
10 SOLUTION INTRAVENOUS ONCE AS NEEDED
Status: DISCONTINUED | OUTPATIENT
Start: 2025-01-18 | End: 2025-01-18

## 2025-01-18 RX ORDER — ONDANSETRON 8 MG/1
8 TABLET, ORALLY DISINTEGRATING ORAL EVERY 8 HOURS PRN
Status: DISCONTINUED | OUTPATIENT
Start: 2025-01-18 | End: 2025-01-18

## 2025-01-18 RX ORDER — SODIUM CHLORIDE 9 MG/ML
INJECTION, SOLUTION INTRAVENOUS
Status: DISCONTINUED | OUTPATIENT
Start: 2025-01-18 | End: 2025-01-20 | Stop reason: HOSPADM

## 2025-01-18 RX ORDER — PRENATAL WITH FERROUS FUM AND FOLIC ACID 3080; 920; 120; 400; 22; 1.84; 3; 20; 10; 1; 12; 200; 27; 25; 2 [IU]/1; [IU]/1; MG/1; [IU]/1; MG/1; MG/1; MG/1; MG/1; MG/1; MG/1; UG/1; MG/1; MG/1; MG/1; MG/1
1 TABLET ORAL DAILY
Status: DISCONTINUED | OUTPATIENT
Start: 2025-01-18 | End: 2025-01-20 | Stop reason: HOSPADM

## 2025-01-18 RX ORDER — DOCUSATE SODIUM 100 MG/1
200 CAPSULE, LIQUID FILLED ORAL 2 TIMES DAILY PRN
Status: DISCONTINUED | OUTPATIENT
Start: 2025-01-18 | End: 2025-01-20 | Stop reason: HOSPADM

## 2025-01-18 RX ORDER — DIPHENOXYLATE HYDROCHLORIDE AND ATROPINE SULFATE 2.5; .025 MG/1; MG/1
2 TABLET ORAL EVERY 6 HOURS PRN
Status: DISCONTINUED | OUTPATIENT
Start: 2025-01-18 | End: 2025-01-18

## 2025-01-18 RX ORDER — DIPHENOXYLATE HYDROCHLORIDE AND ATROPINE SULFATE 2.5; .025 MG/1; MG/1
2 TABLET ORAL EVERY 6 HOURS PRN
Status: DISCONTINUED | OUTPATIENT
Start: 2025-01-18 | End: 2025-01-20 | Stop reason: HOSPADM

## 2025-01-18 RX ORDER — OXYCODONE HYDROCHLORIDE 5 MG/1
5 TABLET ORAL EVERY 4 HOURS PRN
Status: DISCONTINUED | OUTPATIENT
Start: 2025-01-18 | End: 2025-01-20 | Stop reason: HOSPADM

## 2025-01-18 RX ORDER — ACETAMINOPHEN 325 MG/1
650 TABLET ORAL EVERY 6 HOURS SCHEDULED
Status: DISCONTINUED | OUTPATIENT
Start: 2025-01-18 | End: 2025-01-20 | Stop reason: HOSPADM

## 2025-01-18 RX ORDER — DIPHENHYDRAMINE HYDROCHLORIDE 50 MG/ML
25 INJECTION INTRAMUSCULAR; INTRAVENOUS EVERY 4 HOURS PRN
Status: DISCONTINUED | OUTPATIENT
Start: 2025-01-18 | End: 2025-01-20 | Stop reason: HOSPADM

## 2025-01-18 RX ORDER — OXYCODONE HYDROCHLORIDE 10 MG/1
10 TABLET ORAL EVERY 4 HOURS PRN
Status: DISCONTINUED | OUTPATIENT
Start: 2025-01-18 | End: 2025-01-20 | Stop reason: HOSPADM

## 2025-01-18 RX ORDER — FAMOTIDINE 10 MG/ML
20 INJECTION INTRAVENOUS
Status: CANCELLED | OUTPATIENT
Start: 2025-01-18 | End: 2025-01-18

## 2025-01-18 RX ORDER — MISOPROSTOL 200 UG/1
800 TABLET ORAL ONCE AS NEEDED
Status: DISCONTINUED | OUTPATIENT
Start: 2025-01-18 | End: 2025-01-20 | Stop reason: HOSPADM

## 2025-01-18 RX ORDER — HYDROCORTISONE 25 MG/G
CREAM TOPICAL 3 TIMES DAILY PRN
Status: DISCONTINUED | OUTPATIENT
Start: 2025-01-18 | End: 2025-01-20 | Stop reason: HOSPADM

## 2025-01-18 RX ORDER — CARBOPROST TROMETHAMINE 250 UG/ML
250 INJECTION, SOLUTION INTRAMUSCULAR
Status: DISCONTINUED | OUTPATIENT
Start: 2025-01-18 | End: 2025-01-20 | Stop reason: HOSPADM

## 2025-01-18 RX ORDER — OXYTOCIN 10 [USP'U]/ML
10 INJECTION, SOLUTION INTRAMUSCULAR; INTRAVENOUS ONCE AS NEEDED
Status: DISCONTINUED | OUTPATIENT
Start: 2025-01-18 | End: 2025-01-20 | Stop reason: HOSPADM

## 2025-01-18 RX ORDER — SIMETHICONE 80 MG
1 TABLET,CHEWABLE ORAL EVERY 6 HOURS PRN
Status: DISCONTINUED | OUTPATIENT
Start: 2025-01-18 | End: 2025-01-20 | Stop reason: HOSPADM

## 2025-01-18 RX ADMIN — IBUPROFEN 600 MG: 600 TABLET, FILM COATED ORAL at 01:01

## 2025-01-18 RX ADMIN — OXYCODONE HYDROCHLORIDE 10 MG: 10 TABLET ORAL at 05:01

## 2025-01-18 RX ADMIN — OXYCODONE 5 MG: 5 TABLET ORAL at 12:01

## 2025-01-18 RX ADMIN — FENTANYL CITRATE 2 ML: 50 INJECTION INTRAMUSCULAR; INTRAVENOUS at 01:01

## 2025-01-18 RX ADMIN — FENTANYL CITRATE 8 ML/HR: 50 INJECTION INTRAMUSCULAR; INTRAVENOUS at 01:01

## 2025-01-18 RX ADMIN — ACETAMINOPHEN 650 MG: 325 TABLET, FILM COATED ORAL at 12:01

## 2025-01-18 RX ADMIN — DEXTROSE MONOHYDRATE 5 MILLION UNITS: 5 INJECTION INTRAVENOUS at 01:01

## 2025-01-18 RX ADMIN — DIPHENHYDRAMINE HYDROCHLORIDE 25 MG: 50 INJECTION INTRAMUSCULAR; INTRAVENOUS at 02:01

## 2025-01-18 RX ADMIN — OXYCODONE HYDROCHLORIDE 10 MG: 10 TABLET ORAL at 09:01

## 2025-01-18 RX ADMIN — SODIUM CHLORIDE, POTASSIUM CHLORIDE, SODIUM LACTATE AND CALCIUM CHLORIDE 500 ML: 600; 310; 30; 20 INJECTION, SOLUTION INTRAVENOUS at 01:01

## 2025-01-18 RX ADMIN — LIDOCAINE HYDROCHLORIDE,EPINEPHRINE BITARTRATE 3 ML: 15; .005 INJECTION, SOLUTION EPIDURAL; INFILTRATION; INTRACAUDAL; PERINEURAL at 01:01

## 2025-01-18 RX ADMIN — DOCUSATE SODIUM 200 MG: 100 CAPSULE, LIQUID FILLED ORAL at 08:01

## 2025-01-18 RX ADMIN — IBUPROFEN 600 MG: 600 TABLET, FILM COATED ORAL at 08:01

## 2025-01-18 RX ADMIN — ACETAMINOPHEN 650 MG: 325 TABLET, FILM COATED ORAL at 06:01

## 2025-01-18 RX ADMIN — OXYTOCIN 10 UNITS: 10 INJECTION, SOLUTION INTRAMUSCULAR; INTRAVENOUS at 05:01
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
## 2025-01-18 NOTE — H&P
"  [facility] - Labor & Delivery  Obstetrics  History & Physical    Patient Name: Dagmar Quach  MRN: [id number]  Admission Date: 2025  Primary Care Provider: Shanice, Primary Doctor    Subjective:     Principal Problem:Normal labor    History of Present Illness:  Dagmar Quach is a 24 y.o. female  at 39w4d with Estimated Date of Delivery: 25 based on 1T sono who presents to Labor and Delivery accompanied by partner "Khalif" and  "Swetha". Expecting a girl "Vicky"!    Reports regular uterine contractions since 2330 last night. They are now 5 minutes apart and increasing in intensity and duration.  moderate contractions, active fetal movement, No vaginal bleeding.  SROM @ 2330 on 25, Meconium stained.Reports fluid was yellow/brown in color at first then became clear.     She has had regular prenatal care with [facility].     This pregnancy has been complicated by:  GBS +  History of depression: Hx of suicide attempt in 2019, no issues with depression for several years.  History IUGR in previous pregnancy    Last growth sono @ 37w6d: EFW 73%, 3526 grams, AC 96%       Presentation: VTX per bs sono in [facility]  Estimated Fetal Weight: 7 1/2 lb per Leopold  3cm, 70%, -2 on admission    Obstetric HPI:    Patient reports contractions that started @ 2300 last night, active fetal movement, denies vaginal bleeding.   SROM @ 2330, Meconium (fluid yellow in color at first, now clear).       OB History    Para Term  AB Living   3 1 1 0 1 1   SAB IAB Ectopic Multiple Live Births   1 0 0 0 1      # Outcome Date GA Lbr Enrico/2nd Weight Sex Type Anes PTL Lv   3 Current            2 Term 10/05/19 38w1d / 00:06 2.608 kg (5 lb 12 oz) M Vag-Spont None N CHANDLER      Name: MARY QUACH      Apgar1: 9  Apgar5: 9   1 SAB 19 7w0d    SAB        Past Medical History:   Diagnosis Date    Allergy     Lactose intolerance     Urticaria      Past Surgical History:   Procedure Laterality Date    WISDOM TOOTH " EXTRACTION         Facility-Administered Medications Prior to Admission   Medication    RSV, preF A and preF B(PF) (Abrysvo) vaccine 120 mcg     PTA Medications   Medication Sig    azelastine (ASTELIN) 137 mcg (0.1 %) nasal spray 2 sprays (274 mcg total) by Nasal route 2 (two) times daily. (Patient not taking: Reported on 2025)    desloratadine (CLARINEX) 5 mg tablet Take 1 tablet (5 mg total) by mouth once daily. (Patient not taking: Reported on 2025)    fluticasone (FLONASE) 50 mcg/actuation nasal spray 1 spray (50 mcg total) by Each Nare route 2 (two) times daily. (Patient not taking: Reported on 2025)    prenat.vits,alok,min-iron-folic (PRENATAL VITAMIN) Tab Take 1 tablet by mouth once daily.       Review of patient's allergies indicates:  No Known Allergies     Family History       Problem Relation (Age of Onset)    Asthma Sister    Migraines Sister    No Known Problems Father, Mother          Tobacco Use    Smoking status: Former     Types: Cigars     Quit date: 2019     Years since quittin.7    Smokeless tobacco: Never   Substance and Sexual Activity    Alcohol use: No    Drug use: No    Sexual activity: Yes     Partners: Male     Review of Systems   Constitutional:  Negative for chills and fever.   Eyes:  Negative for visual disturbance.   Respiratory:  Negative for cough.    Gastrointestinal:  Positive for abdominal pain. Negative for nausea and vomiting.   Genitourinary:  Negative for vaginal bleeding and vaginal pain. Vaginal discharge: SROM with meconium fluid.  Neurological:  Negative for seizures and headaches.   Psychiatric/Behavioral:  Positive for depression.    All other systems reviewed and are negative.     Objective:     Vital Signs (Most Recent):    Vital Signs (24h Range):  Pulse:  [96] 96  BP: (116)/(68) 116/68        There is no height or weight on file to calculate BMI.    FHT:  130 bpm; Cat 1  TOCO:  Q 5 minutes     Physical Exam:   Constitutional: She is oriented to  person, place, and time. She appears well-developed and well-nourished.    HENT:   Head: Normocephalic and atraumatic.    Eyes: Pupils are equal, round, and reactive to light.     Cardiovascular:  Normal rate, regular rhythm, S1 normal and S2 normal.             Pulmonary/Chest: Effort normal. No respiratory distress. She has no decreased breath sounds. She has no wheezes.        Abdominal: Soft. There is no abdominal tenderness.     Genitourinary:    Vagina and uterus normal.   Vaginal discharge: SROM with meconium fluid.           Musculoskeletal: Normal range of motion and moves all extremeties.       Neurological: She is alert and oriented to person, place, and time.    Skin: Skin is warm and dry.    Psychiatric: She has a normal mood and affect. Thought content normal.        Cervix:  Dilation:  3  Effacement:  75%  Station: -2  Presentation: Vertex per OB team on admission     Significant Labs:  Lab Results   Component Value Date    GROUPTRH O POS 10/04/2019    HEPBSAG Negative 2019    STREPBCULT (A) 2019     STREPTOCOCCUS AGALACTIAE (GROUP B)  Beta-hemolytic streptococci are routinely susceptible to   penicillins,cephalosporins and carbapenems.         I have personallly reviewed all pertinent lab results from the last 24 hours.  Assessment/Plan:     24 y.o. female  at 39w4d for:    Normal Labor  -Admit to Labor and Delivery  -IV; routine labs  -Continuous EFM and toco  -Epidural per pt request  -Consult/Collab with OB team PRN    Meconium Stained Fluid  -Continuous monitoring  -NICU available     GBS +  -PCN     History of depression  -2week PP mood check      Susan Mendez CNM  Obstetrics  [facility] - Labor & Delivery

## 2025-01-18 NOTE — PROGRESS NOTES
LABOR NOTE    S:  Pt in bed on left lateral side, epidural infusing, has a hot spot on left side. Partner and  at bedside.      O: /69   Pulse 99   Temp 98.8 °F (37.1 °C) (Oral)   Resp (!) 24   LMP 2024   SpO2 97%   Breastfeeding No     GENERAL: Calm and appropriate affect  NEURO: Alert, oriented, normal speech  ABDOMEN: Nontender, Fundus palpates soft between UC's.  FHT: Baseline 140, moderate BTBV, positive accels, late decelerations. Cat 2.  CTX: q 2-3 minutes  SVE: /-2 @ 0236      ASSESSMENT:   24 y.o.  IUP at 39w4d, FHT reassuring/ Cat 2    Maternal position changed to spine for hunt catheter placement, prolonged fetal deceleration followed.   Intermittent late decelerations despite position changes. Patient on hands and knees again and heart tones returned to normal baseline/reassuring.      PLAN:  -Continue close Maternal/Fetal monitoring  -Recheck cervix q 2 hours, sooner PRN  -Continue PCN for GBS  -Consult/Collab with OB team; updated on maternal status and reviewed strip with ANNE Mendez CNM

## 2025-01-18 NOTE — PROGRESS NOTES
CNM called to bedside.     Prolonged fetal deceration post epidural placement with maternal hypotensive episode. Patient vomiting.  SVE 5cm, 70%, -2    IV bolus, Position changed to hands and knees, then left lateral position. Anesthesia @ bedside. Ob team @ bedside.   FHT returned to normal baseline.       JAMEL Mendez CNM

## 2025-01-18 NOTE — PLAN OF CARE
VSS Patient denies HA, dizziness, vision changes, and RUQ pain. Pt ambulating independently and voiding without difficulty. Patient safety maintained, side rails up, bed low and locked position. Pain  controlled with  pain medication. Fundus midline and firm with minimal lochia. Significant other at bedside and attentive to patient's needs; parents responding to infant cues and bonding appropriately. No additional needs at this time.

## 2025-01-18 NOTE — ANESTHESIA PROCEDURE NOTES
CSE    Patient location during procedure: OB  Start time: 1/18/2025 1:05 AM  Timeout: 1/18/2025 1:05 AM  End time: 1/18/2025 1:25 AM    Reason for block: labor analgesia requested by patient and obstetrician    Staffing  Authorizing Provider: Anjali Benitez MD  Performing Provider: Crista Brooks DO    Staffing  Performed by: Crista Brooks DO  Authorized by: Anjali Benitez MD    Preanesthetic Checklist  Completed: patient identified, IV checked, site marked, risks and benefits discussed, surgical consent, monitors and equipment checked, pre-op evaluation and timeout performed  CSE  Patient position: sitting  Prep: ChloraPrep  Patient monitoring: heart rate, frequent blood pressure checks and continuous pulse ox  Approach: midline  Spinal Needle  Needle type: pencil-tip   Needle gauge: 25 G  Needle length: 5 in  Epidural Needle  Injection technique: TRAVIS air  Needle type: Tuohy   Needle gauge: 17 G  Needle length: 3.5 in  Needle insertion depth: 7 cm  Location: L3-4  Needle localization: anatomical landmarks   Catheter  Catheter type: springw[employer]  Catheter size: 19 G  Catheter at skin depth: 11 cm  Test dose: lidocaine 2% with Epi 1-to-200,000 and negative  Test dose: 3 mL  Additional Documentation: negative aspiration for CSF, negative aspiration for heme and negative test dose

## 2025-01-18 NOTE — HPI
"Dagmar Quach is a 24 y.o. female  at 39w4d with Estimated Date of Delivery: 25 based on 1T sono who presents to Labor and Delivery accompanied by partner "Khalif" and  "Swetha". Expecting a girl "Vicky"!    Reports regular uterine contractions since 2330 last night. They are now 5 minutes apart and increasing in intensity and duration.  moderate contractions, active fetal movement, No vaginal bleeding.  SROM @ 2330 on 25, Meconium stained.Reports fluid was yellow/brown in color at first then became clear.     She has had regular prenatal care with [facility].     This pregnancy has been complicated by:  GBS +  History of depression: Hx of suicide attempt in 2019, no issues with depression for several years.  History IUGR in previous pregnancy    Last growth sono @ 37w6d: EFW 73%, 3526 grams, AC 96%       Presentation: VTX per bs sono in [facility]  Estimated Fetal Weight: 7 1/2 lb per Leopold  3cm, 70%, -2 on admission  "

## 2025-01-18 NOTE — HOSPITAL COURSE
25: Admit to LD in active labor. GBS +    2025 PPD # 1  24 year old G P s/p . First degree laceration. QBL 50 cc. Doing well, ambulating w/o vertigo or weakness, voiding, and tolerating regular diet. Lochia is steadily decreasing. VSS, normotensive and afebrile. Breastfeeding infant w/o difficulty. PO pain meds managing postpartum discomforts. Has not had BM since birth. Normal involution. Depression/anxiety: has a hx w/ a suicide attempt in 2019. No s/s today. Support at home: partner and family. Rh +, RI (from 24 although rubella titer in progress from hospital admission). Does not want early discharge to home today. Desired method of contraception: undecided; understands that progesterone only options are appropriate with breastfeeding. Not UTD on immunizations; declined influenza, RSV, and TdaP during pregnancy. Has not had any COVID vaccinations. Last TdaP  so is in date as a non-pregnant female now even though declined in pregnancy. Recommended influenza vaccine prior to D/C and declines. Bonding well w/ baby, who is doing well; will FU w/peds provider. Plan D/C tomorrow.     25: PPD2: Doing well, normal involution and lochia, breast/ bottle feeding without difficulty, d/c home today.   All PP warning signs and education done. Discussed when to report to [facility] vs call clinic. eRX sent to pharmacy. Follow up in office x 6 weeks for routine PP visit and x 2 weeks for virtual mood check.

## 2025-01-18 NOTE — ED PROVIDER NOTES
Encounter Date: 2025       History     Chief Complaint   Patient presents with    Contractions     Dagmar Quach is a 24 y.o.  at 39w4d who presents to the OB ED today (2025) with CC of SROM w/[facility] at 2330 today.    Patient reports contractions beginning around 2000 this evening followed by rupture of membranes at 2330. Patient reports the fluid appeared brownish/yellow and then turned clear. She reports associated nausea and vomiting.     She reports no vaginal bleeding. She reports good fetal movement.    This pregnancy is complicated by GBS+.          Review of patient's allergies indicates:  No Known Allergies  Past Medical History:   Diagnosis Date    Allergy     Lactose intolerance     Urticaria      Past Surgical History:   Procedure Laterality Date    WISDOM TOOTH EXTRACTION       Family History   Problem Relation Name Age of Onset    No Known Problems Father      No Known Problems Mother Ivania     Migraines Sister      Asthma Sister      Melanoma Neg Hx      Allergic rhinitis Neg Hx      Allergies Neg Hx      Angioedema Neg Hx      Atopy Neg Hx      Eczema Neg Hx      Immunodeficiency Neg Hx      Urticaria Neg Hx      Rhinitis Neg Hx      Breast cancer Neg Hx       Social History     Tobacco Use    Smoking status: Former     Types: Cigars     Quit date: 2019     Years since quittin.7    Smokeless tobacco: Never   Substance Use Topics    Alcohol use: No    Drug use: No     Review of Systems   Constitutional:  Negative for chills and fever.   HENT:  Negative for congestion.    Gastrointestinal:  Positive for nausea and vomiting.   Genitourinary:  Positive for vaginal discharge. Negative for vaginal bleeding.       Physical Exam     Initial Vitals [25 0020]   BP Pulse Resp Temp SpO2   122/66 103 (!) 24 98.8 °F (37.1 °C) 97 %      MAP       --         Physical Exam    Vitals reviewed.  Constitutional: She appears well-developed and well-nourished.   Uncomfortable appearing,  especially with contractions   HENT:   Head: Normocephalic and atraumatic.   Eyes: EOM are normal.   Neck:   Normal range of motion.  Cardiovascular:  Normal rate.           Pulmonary/Chest: No respiratory distress.   Abdominal: Abdomen is soft. There is no abdominal tenderness.   Musculoskeletal:         General: Normal range of motion.      Cervical back: Normal range of motion.     Neurological: She is oriented to person, place, and time.   Skin: Skin is warm and dry.   Psychiatric: She has a normal mood and affect.     OB LABOR EXAM:   Pre-Term Labor: No.   Membranes ruptured: Yes.     Vaginal Bleeding: none present.     Dilatation: 3.   Station: -2.   Effacement: 70%.   Amniotic Fluid Color: yellow.   Amniotic Fluid Amount: moderate.   Comments: +pooling, +nitrazine, +ferning       ED Course   Obtain Fetal nonstress test (NST)    Date/Time: 1/18/2025 12:40 AM    Performed by: Julia Choudhary MD  Authorized by: Julia Choudhary MD    Nonstress Test:     Variability:  6-25 BPM    Decelerations:  None    Accelerations:  15 bpm    Baseline:  120    Contractions:  Regular  Biophysical Profile:     Nonstress Test Interpretation: reactive      Overall Impression:  Reassuring    Labs Reviewed   CBC W/ AUTO DIFFERENTIAL - Abnormal       Result Value    WBC 12.83 (*)     RBC 4.16      Hemoglobin 12.6      Hematocrit 36.8 (*)     MCV 89      MCH 30.3      MCHC 34.2      RDW 13.8      Platelets 203      MPV 12.3      Immature Granulocytes 1.2 (*)     Gran # (ANC) 8.5 (*)     Immature Grans (Abs) 0.15 (*)     Lymph # 2.9      Mono # 1.2 (*)     Eos # 0.0      Baso # 0.04      nRBC 0      Gran % 66.1      Lymph % 22.4      Mono % 9.7      Eosinophil % 0.3      Basophil % 0.3      Differential Method Automated            Imaging Results    None          Medications   lactated ringers bolus 1,000 mL (has no administration in time range)   0.9% NaCl infusion (has no administration in time range)   oxytocin 30 units/500 mL (60  milliunits/mL) in 0.9% NaCl IV bolus from bag (has no administration in time range)   calcium carbonate 200 mg calcium (500 mg) chewable tablet 500 mg (has no administration in time range)   LIDOcaine HCL 10 mg/ml (1%) injection 10 mL (has no administration in time range)   tranexamic acid in NaCl,iso-os IVPB 1,000 mg (has no administration in time range)   sodium chloride 0.9% flush 10 mL (has no administration in time range)   oxytocin 30 units/500 mL (60 milliunits/mL) in 0.9% NaCl (non-titrating) (has no administration in time range)   acetaminophen tablet 650 mg (650 mg Oral Given 1/20/25 0708)   lanolin cream (has no administration in time range)   benzocaine-lanolin (DERMOPLAST) topical spray (has no administration in time range)   hydrocortisone 2.5 % rectal cream (has no administration in time range)   ondansetron disintegrating tablet 8 mg (has no administration in time range)   docusate sodium capsule 200 mg (200 mg Oral Given 1/20/25 0814)   simethicone chewable tablet 80 mg (has no administration in time range)   diphenhydrAMINE capsule 25 mg (has no administration in time range)   diphenhydrAMINE injection 25 mg (has no administration in time range)   prenatal vitamin oral tablet (1 tablet Oral Given 1/20/25 0814)   measles, mumps and rubella vaccine 1,000-12,500 TCID50/0.5 mL injection 0.5 mL (has no administration in time range)   Tdap (BOOSTRIX) vaccine injection 0.5 mL (has no administration in time range)   oxytocin 30 units/500 mL (60 milliunits/mL) in 0.9% NaCl IV bolus from bag (has no administration in time range)   oxytocin 30 units/500 mL (60 milliunits/mL) in 0.9% NaCl (non-titrating) (has no administration in time range)   oxytocin injection 10 Units (has no administration in time range)   miSOPROStoL tablet 800 mcg (has no administration in time range)   miSOPROStoL tablet 800 mcg (has no administration in time range)   methylergonovine injection 200 mcg (has no administration in time  range)   carboprost injection 250 mcg (has no administration in time range)   tranexamic acid in NaCl,iso-os IVPB 1,000 mg (has no administration in time range)   diphenoxylate-atropine 2.5-0.025 mg per tablet 2 tablet (has no administration in time range)   ibuprofen tablet 600 mg (600 mg Oral Given 25 0708)   oxyCODONE immediate release tablet 5 mg (0 mg Oral Return to Cabinet 25 9874)   oxyCODONE immediate release tablet Tab 10 mg (10 mg Oral Given 25 0337)   fentanyl 2mcg/mL with BUPivacaine 0.1% in sdoium chloride 0.9% Epidural 2 mcg/mL- 0.1 % Soln (  Override pull for Anesthesia 25 0132)   lactated ringers bolus 500 mL (500 mLs Intravenous New Bag 25 0102)   oxytocin injection 10 Units (10 Units Intramuscular Given 25 0546)   penicillin G potassium 5 Million Units in D5W 100 mL IVPB (MB+) (5 Million Units Intravenous New Bag 25 0152)   diphenhydrAMINE injection 25 mg (25 mg Intravenous Given 25 0219)     Medical Decision Making  Dagmar Quach is a 24 y.o.  at 39w4d who presents to the OB ED today (2025) with CC of SROM w/mec at 2330 today    Pulse:  [96] 96  BP: (116)/(68) 116/68      Loss of Fluid  - Patient notes meconium stained/yellow LoF occurred at 2330  - Contractions since  this evening. Associated nausea and vomiting  - Positive pooling, nitrazine positive  - SVE: 3/70/-2  - US: vertex  - Consents in chart   - Patient sees midwives, last visit today at which point she reports 1 cm dilation  - She reports she desires an unmedicated birth   - Midwives notified and confirmed they will place orders and admit   - To L&D    Julia Choudhary MD  OBGYN   PGY-1        Risk  Decision regarding hospitalization.              Attending Attestation:   Physician Attestation Statement for Resident:  As the supervising MD   Physician Attestation Statement: I have personally seen and examined this patient.   I agree with the above history.  -:   As the  supervising MD I agree with the above PE.     As the supervising MD I agree with the above treatment, course, plan, and disposition.   I was personally present during the critical portions of the procedure(s) performed by the resident and was immediately available in the ED to provide services and assistance as needed during the entire procedure.  I have reviewed and agree with the residents interpretation of the following: lab data.  I have reviewed the following: old records at this facility.            Attending ED Notes:   I saw and examined the patient myself along with the resident. I have personally reviewed pertinent prior records, labs, imaging, and procedures. I have reviewed the documentation and agree with the findings and plan as documented.      at 39w4d presenting with contractions and leakage of fluid and found to be in labor with SROM with meconium-stained fluid. Cat 1 FHT. Admit to CN service on L&D for labor management.     Mariama Crowe MD FACOG  OB Hospitalist                                 Clinical Impression:  Final diagnoses:  [O47.9] Uterine contractions during pregnancy (Primary)  [Z3A.39] 39 weeks gestation of pregnancy          ED Disposition Condition    Admit Stable                Julia Choudhary MD  Resident  25 0049       Mariama Crowe MD  25 0962

## 2025-01-18 NOTE — LACTATION NOTE
01/18/25 1600   Maternal Assessment   Breast Shape Bilateral:;round   Breast Density Bilateral:;soft   Areola Bilateral:;elastic   Nipples Bilateral:;everted   Maternal Infant Feeding   Maternal Emotional State assist needed   Infant Positioning clutch/football   Signs of Milk Transfer audible swallow;infant jaw motion present   Pain with Feeding no   Nipple Shape After Feeding, Left round   Latch Assistance yes   Breast Pumping   Breast Pumping hand expression utilized
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
## 2025-01-18 NOTE — PLAN OF CARE
Lactation note:  Lactation consultant's first visit with mother. Reviewed first day expectations for breastfeeding using the postpartum guide. Mom may want to pump at some point. Discussed pros/cons with early pumping and bottle feeding. We discussed that colostrum is adequate and important to feed baby the first few days of life. Offered to assist with breastfeeding at next feeding. The feeding plan was reviewed. The mother will feed infant with cues 8 or more times in 24 hours until content. Frequent skin to skin will assist with feedings and identifying early hunger cues. Left LC phone number on board for patient to call for assistance.

## 2025-01-18 NOTE — SUBJECTIVE & OBJECTIVE
Obstetric HPI:    Patient reports contractions that started @ 2300 last night, active fetal movement, denies vaginal bleeding.   SROM @ 2330, Meconium (fluid yellow in color at first, now clear).       OB History    Para Term  AB Living   3 1 1 0 1 1   SAB IAB Ectopic Multiple Live Births   1 0 0 0 1      # Outcome Date GA Lbr Enrico/2nd Weight Sex Type Anes PTL Lv   3 Current            2 Term 10/05/19 38w1d / 00:06 2.608 kg (5 lb 12 oz) M Vag-Spont None N CHANDLER      Name: MARY VAUGHN      Apgar1: 9  Apgar5: 9   1 SAB 19 7w0d    SAB        Past Medical History:   Diagnosis Date    Allergy     Lactose intolerance     Urticaria      Past Surgical History:   Procedure Laterality Date    WISDOM TOOTH EXTRACTION         Facility-Administered Medications Prior to Admission   Medication    RSV, preF A and preF B(PF) (Abrysvo) vaccine 120 mcg     PTA Medications   Medication Sig    azelastine (ASTELIN) 137 mcg (0.1 %) nasal spray 2 sprays (274 mcg total) by Nasal route 2 (two) times daily. (Patient not taking: Reported on 2025)    desloratadine (CLARINEX) 5 mg tablet Take 1 tablet (5 mg total) by mouth once daily. (Patient not taking: Reported on 2025)    fluticasone (FLONASE) 50 mcg/actuation nasal spray 1 spray (50 mcg total) by Each Nare route 2 (two) times daily. (Patient not taking: Reported on 2025)    prenat.vits,alok,min-iron-folic (PRENATAL VITAMIN) Tab Take 1 tablet by mouth once daily.       Review of patient's allergies indicates:  No Known Allergies     Family History       Problem Relation (Age of Onset)    Asthma Sister    Migraines Sister    No Known Problems Father, Mother          Tobacco Use    Smoking status: Former     Types: Cigars     Quit date: 2019     Years since quittin.7    Smokeless tobacco: Never   Substance and Sexual Activity    Alcohol use: No    Drug use: No    Sexual activity: Yes     Partners: Male     Review of Systems   Constitutional:   Negative for chills and fever.   Eyes:  Negative for visual disturbance.   Respiratory:  Negative for cough.    Gastrointestinal:  Positive for abdominal pain. Negative for nausea and vomiting.   Genitourinary:  Negative for vaginal bleeding and vaginal pain. Vaginal discharge: SROM with meconium fluid.  Neurological:  Negative for seizures and headaches.   Psychiatric/Behavioral:  Positive for depression.    All other systems reviewed and are negative.     Objective:     Vital Signs (Most Recent):    Vital Signs (24h Range):  Pulse:  [96] 96  BP: (116)/(68) 116/68        There is no height or weight on file to calculate BMI.    FHT:  130 bpm; Cat 1  TOCO:  Q 5 minutes     Physical Exam:   Constitutional: She is oriented to person, place, and time. She appears well-developed and well-nourished.    HENT:   Head: Normocephalic and atraumatic.    Eyes: Pupils are equal, round, and reactive to light.     Cardiovascular:  Normal rate, regular rhythm, S1 normal and S2 normal.             Pulmonary/Chest: Effort normal. No respiratory distress. She has no decreased breath sounds. She has no wheezes.        Abdominal: Soft. There is no abdominal tenderness.     Genitourinary:    Vagina and uterus normal.   Vaginal discharge: SROM with meconium fluid.           Musculoskeletal: Normal range of motion and moves all extremeties.       Neurological: She is alert and oriented to person, place, and time.    Skin: Skin is warm and dry.    Psychiatric: She has a normal mood and affect. Thought content normal.        Cervix:  Dilation:  3  Effacement:  75%  Station: -2  Presentation: Vertex per OB team on admission     Significant Labs:  Lab Results   Component Value Date    GROUPTRH O POS 10/04/2019    HEPBSAG Negative 05/14/2019    STREPBCULT (A) 09/26/2019     STREPTOCOCCUS AGALACTIAE (GROUP B)  Beta-hemolytic streptococci are routinely susceptible to   penicillins,cephalosporins and carbapenems.         I have personallly  reviewed all pertinent lab results from the last 24 hours.

## 2025-01-19 PROBLEM — Z23 NEED FOR INFLUENZA VACCINATION: Status: ACTIVE | Noted: 2025-01-19

## 2025-01-19 LAB
BASOPHILS # BLD AUTO: 0.04 K/UL (ref 0–0.2)
BASOPHILS NFR BLD: 0.3 % (ref 0–1.9)
DIFFERENTIAL METHOD BLD: ABNORMAL
EOSINOPHIL # BLD AUTO: 0.1 K/UL (ref 0–0.5)
EOSINOPHIL NFR BLD: 0.6 % (ref 0–8)
ERYTHROCYTE [DISTWIDTH] IN BLOOD BY AUTOMATED COUNT: 13.9 % (ref 11.5–14.5)
HCT VFR BLD AUTO: 35.5 % (ref 37–48.5)
HGB BLD-MCNC: 11.9 G/DL (ref 12–16)
IMM GRANULOCYTES # BLD AUTO: 0.19 K/UL (ref 0–0.04)
IMM GRANULOCYTES NFR BLD AUTO: 1.3 % (ref 0–0.5)
LYMPHOCYTES # BLD AUTO: 3.4 K/UL (ref 1–4.8)
LYMPHOCYTES NFR BLD: 22.7 % (ref 18–48)
MCH RBC QN AUTO: 30.1 PG (ref 27–31)
MCHC RBC AUTO-ENTMCNC: 33.5 G/DL (ref 32–36)
MCV RBC AUTO: 90 FL (ref 82–98)
MONOCYTES # BLD AUTO: 1.1 K/UL (ref 0.3–1)
MONOCYTES NFR BLD: 7 % (ref 4–15)
NEUTROPHILS # BLD AUTO: 10.3 K/UL (ref 1.8–7.7)
NEUTROPHILS NFR BLD: 68.1 % (ref 38–73)
NRBC BLD-RTO: 0 /100 WBC
PLATELET # BLD AUTO: 193 K/UL (ref 150–450)
PMV BLD AUTO: 12.5 FL (ref 9.2–12.9)
RBC # BLD AUTO: 3.95 M/UL (ref 4–5.4)
WBC # BLD AUTO: 15.1 K/UL (ref 3.9–12.7)

## 2025-01-19 PROCEDURE — 25000003 PHARM REV CODE 250

## 2025-01-19 PROCEDURE — 36415 COLL VENOUS BLD VENIPUNCTURE: CPT

## 2025-01-19 PROCEDURE — 11000001 HC ACUTE MED/SURG PRIVATE ROOM

## 2025-01-19 PROCEDURE — 85025 COMPLETE CBC W/AUTO DIFF WBC: CPT

## 2025-01-19 PROCEDURE — 99233 SBSQ HOSP IP/OBS HIGH 50: CPT | Mod: TH,,, | Performed by: ADVANCED PRACTICE MIDWIFE

## 2025-01-19 RX ADMIN — OXYCODONE HYDROCHLORIDE 10 MG: 10 TABLET ORAL at 05:01

## 2025-01-19 RX ADMIN — ACETAMINOPHEN 650 MG: 325 TABLET, FILM COATED ORAL at 12:01

## 2025-01-19 RX ADMIN — OXYCODONE HYDROCHLORIDE 10 MG: 10 TABLET ORAL at 11:01

## 2025-01-19 RX ADMIN — OXYCODONE HYDROCHLORIDE 10 MG: 10 TABLET ORAL at 03:01

## 2025-01-19 RX ADMIN — DOCUSATE SODIUM 200 MG: 100 CAPSULE, LIQUID FILLED ORAL at 07:01

## 2025-01-19 RX ADMIN — IBUPROFEN 600 MG: 600 TABLET, FILM COATED ORAL at 12:01

## 2025-01-19 RX ADMIN — DOCUSATE SODIUM 200 MG: 100 CAPSULE, LIQUID FILLED ORAL at 08:01

## 2025-01-19 RX ADMIN — OXYCODONE HYDROCHLORIDE 10 MG: 10 TABLET ORAL at 01:01

## 2025-01-19 RX ADMIN — ACETAMINOPHEN 650 MG: 325 TABLET, FILM COATED ORAL at 11:01

## 2025-01-19 RX ADMIN — IBUPROFEN 600 MG: 600 TABLET, FILM COATED ORAL at 05:01

## 2025-01-19 RX ADMIN — ACETAMINOPHEN 650 MG: 325 TABLET, FILM COATED ORAL at 05:01

## 2025-01-19 RX ADMIN — IBUPROFEN 600 MG: 600 TABLET, FILM COATED ORAL at 11:01

## 2025-01-19 RX ADMIN — PRENATAL VIT W/ FE FUMARATE-FA TAB 27-0.8 MG 1 TABLET: 27-0.8 TAB at 08:01

## 2025-01-19 NOTE — PLAN OF CARE
VSS. Pain controlled with scheduled Tylenol and Ibuprofen. PRN Oxycodone as needed. Breastfeeding without difficulties. Fundus firm and midline with light lochia rubra. Voiding spontaneously with adequate output. Passing gas. No concerns at this time.

## 2025-01-19 NOTE — SUBJECTIVE & OBJECTIVE
Interval History: PPD#1    2025 PPD # 1  24 year old G P s/p . First degree laceration. QBL 50 cc. Doing well, ambulating w/o vertigo or weakness, voiding, and tolerating regular diet. Lochia is steadily decreasing. VSS, normotensive and afebrile. Breastfeeding infant w/o difficulty. PO pain meds managing postpartum discomforts. Has not had BM since birth. Normal involution. Depression/anxiety: has a hx w/ a suicide attempt in 2019. No s/s today. Support at home: partner and family. Rh +, RI (from 24 although rubella titer in progress from hospital admission). Does not want early discharge to home today. Desired method of contraception: undecided; understands that progesterone only options are appropriate with breastfeeding. Not UTD on immunizations; declined influenza, RSV, and TdaP during pregnancy. Has not had any COVID vaccinations. Last TdaP  so is in date as a non-pregnant female now even though declined in pregnancy. Recommended influenza vaccine prior to D/C and declines. Bonding well w/ baby, who is doing well; will FU w/peds provider. Plan D/C tomorrow.       Objective:     Vital Signs (Most Recent):  Temp: 97.9 °F (36.6 °C) (25 0747)  Pulse: 69 (25 0747)  Resp: 18 (25 1112)  BP: 109/68 (25 0747)  SpO2: 98 % (25 0747) Vital Signs (24h Range):  Temp:  [97.9 °F (36.6 °C)-98.2 °F (36.8 °C)] 97.9 °F (36.6 °C)  Pulse:  [65-78] 69  Resp:  [17-20] 18  SpO2:  [95 %-98 %] 98 %  BP: (109-124)/(68-75) 109/68     Weight: 84.4 kg (186 lb 1.1 oz)  Body mass index is 30.96 kg/m².      Intake/Output Summary (Last 24 hours) at 2025 1126  Last data filed at 2025 1701  Gross per 24 hour   Intake --   Output 2650 ml   Net -2650 ml         Significant Labs:  Lab Results   Component Value Date    GROUPTRH O POS 2025    HEPBSAG Negative 2019    STREPBCULT (A) 2019     STREPTOCOCCUS AGALACTIAE (GROUP B)  Beta-hemolytic streptococci are routinely  susceptible to   penicillins,cephalosporins and carbapenems.       Recent Labs   Lab 01/19/25  0730   HGB 11.9*   HCT 35.5*       I have personallly reviewed all pertinent lab results from the last 24 hours.    Physical Exam      Gen: A&O x 4, NAD  CV: normal HR  Lungs: normal resp effort  Breasts: bilaterally soft, non-tender, nipples intact without breakdown  Abdomen: soft, non-tender, uterus firm at U - 1 fb  Diastasis Recti:  4 FB  Perineum: well approximated, no edema or ecchymosis   Lochia: minimal rubra  Ext: bilaterally no pedal edema without signs of DVT    Review of Systems

## 2025-01-19 NOTE — ANESTHESIA POSTPROCEDURE EVALUATION
Anesthesia Post Evaluation    Patient: Dagmar Quach    Procedure(s) Performed: * No procedures listed *    Final Anesthesia Type: epidural      Patient location during evaluation: labor & delivery  Patient participation: Yes- Able to Participate  Level of consciousness: awake and alert, awake and oriented  Post-procedure vital signs: reviewed and stable  Pain management: adequate  Airway patency: patent  KANIKA mitigation strategies: Multimodal analgesia and Use of major conduction anesthesia (spinal/epidural) or peripheral nerve block  PONV status at discharge: No PONV  Anesthetic complications: no      Cardiovascular status: blood pressure returned to baseline and hemodynamically stable  Respiratory status: unassisted, spontaneous ventilation and room air  Hydration status: euvolemic  Follow-up not needed.              Vitals Value Taken Time   /68 01/19/25 0747   Temp 36.6 °C (97.9 °F) 01/19/25 0747   Pulse 69 01/19/25 0747   Resp 18 01/19/25 1112   SpO2 98 % 01/19/25 0747         No case tracking events are documented in the log.      Pain/Fatimah Score: Pain Rating Prior to Med Admin: 7 (1/19/2025 11:12 AM)  Pain Rating Post Med Admin: 5 (1/19/2025  6:12 AM)

## 2025-01-19 NOTE — PROGRESS NOTES
[facility])  Obstetrics  Postpartum Progress Note    Patient Name: Dagmar Quach  MRN: [id number]  Admission Date: 2025  Hospital Length of Stay: 1 days  Attending Physician: Marycarmen Adams DO  Primary Care Provider: Shanice, Primary Doctor    Subjective:     Principal Problem: (spontaneous vaginal delivery)    Hospital Course:  25: Admit to LD in active labor. GBS +    2025 PPD # 1  24 year old G P s/p . First degree laceration. QBL 50 cc. Doing well, ambulating w/o vertigo or weakness, voiding, and tolerating regular diet. Lochia is steadily decreasing. VSS, normotensive and afebrile. Breastfeeding infant w/o difficulty. PO pain meds managing postpartum discomforts. Has not had BM since birth. Normal involution. Depression/anxiety: has a hx w/ a suicide attempt in 2019. No s/s today. Support at home: partner and family. Rh +, RI (from 24 although rubella titer in progress from hospital admission). Does not want early discharge to home today. Desired method of contraception: undecided; understands that progesterone only options are appropriate with breastfeeding. Not UTD on immunizations; declined influenza, RSV, and TdaP during pregnancy. Has not had any COVID vaccinations. Last TdaP  so is in date as a non-pregnant female now even though declined in pregnancy. Recommended influenza vaccine prior to D/C and declines. Bonding well w/ baby, who is doing well; will FU w/peds provider. Plan D/C tomorrow.     Interval History: PPD#1    2025 PPD # 1  24 year old G P s/p . First degree laceration. QBL 50 cc. Doing well, ambulating w/o vertigo or weakness, voiding, and tolerating regular diet. Lochia is steadily decreasing. VSS, normotensive and afebrile. Breastfeeding infant w/o difficulty. PO pain meds managing postpartum discomforts. Has not had BM since birth. Normal involution. Depression/anxiety: has a hx w/ a suicide attempt in 2019. No s/s today. Support  at home: partner and family. Rh +, RI (from 5/30/24 although rubella titer in progress from hospital admission). Does not want early discharge to home today. Desired method of contraception: undecided; understands that progesterone only options are appropriate with breastfeeding. Not UTD on immunizations; declined influenza, RSV, and TdaP during pregnancy. Has not had any COVID vaccinations. Last TdaP 2019 so is in date as a non-pregnant female now even though declined in pregnancy. Recommended influenza vaccine prior to D/C and declines. Bonding well w/ baby, who is doing well; will FU w/peds provider. Plan D/C tomorrow.       Objective:     Vital Signs (Most Recent):  Temp: 97.9 °F (36.6 °C) (01/19/25 0747)  Pulse: 69 (01/19/25 0747)  Resp: 18 (01/19/25 1112)  BP: 109/68 (01/19/25 0747)  SpO2: 98 % (01/19/25 0747) Vital Signs (24h Range):  Temp:  [97.9 °F (36.6 °C)-98.2 °F (36.8 °C)] 97.9 °F (36.6 °C)  Pulse:  [65-78] 69  Resp:  [17-20] 18  SpO2:  [95 %-98 %] 98 %  BP: (109-124)/(68-75) 109/68     Weight: 84.4 kg (186 lb 1.1 oz)  Body mass index is 30.96 kg/m².      Intake/Output Summary (Last 24 hours) at 1/19/2025 1126  Last data filed at 1/18/2025 1701  Gross per 24 hour   Intake --   Output 2650 ml   Net -2650 ml         Significant Labs:  Lab Results   Component Value Date    GROUPTRH O POS 01/18/2025    HEPBSAG Negative 05/14/2019    STREPBCULT (A) 09/26/2019     STREPTOCOCCUS AGALACTIAE (GROUP B)  Beta-hemolytic streptococci are routinely susceptible to   penicillins,cephalosporins and carbapenems.       Recent Labs   Lab 01/19/25  0730   HGB 11.9*   HCT 35.5*       I have personallly reviewed all pertinent lab results from the last 24 hours.    Physical Exam      Gen: A&O x 4, NAD  CV: normal HR  Lungs: normal resp effort  Breasts: bilaterally soft, non-tender, nipples intact without breakdown  Abdomen: soft, non-tender, uterus firm at U - 1 fb  Diastasis Recti:  4 FB  Perineum: well approximated, no edema  or ecchymosis   Lochia: minimal rubra  Ext: bilaterally no pedal edema without signs of DVT    Review of Systems  Assessment/Plan:     2025 PPD # 1  24 year old G P s/p . First degree laceration. QBL 50 cc. . Breastfeeding infant w/o difficulty. Depression/anxiety: has a hx w/ a suicide attempt in 2019. No s/s today. Rh +, RI (from 24 although rubella titer in progress from hospital admission). Does not want early discharge to home today. Desired method of contraception: undecided. Not UTD on immunizations; declined influenza, RSV, and TdaP during pregnancy. Has not had any COVID vaccinations. Last TdaP 2019 so is in date as a non-pregnant female now even though declined in pregnancy. Recommended influenza vaccine prior to D/C and declines.       Plan D/C tomorrow.       Disposition: As patient meets milestones, will plan to discharge tomorrow.    Yanni Berry CNM  Obstetrics  [facility]       No

## 2025-01-20 VITALS
OXYGEN SATURATION: 99 % | SYSTOLIC BLOOD PRESSURE: 111 MMHG | RESPIRATION RATE: 18 BRPM | WEIGHT: 186.06 LBS | DIASTOLIC BLOOD PRESSURE: 75 MMHG | TEMPERATURE: 98 F | HEART RATE: 94 BPM | HEIGHT: 65 IN | BODY MASS INDEX: 31 KG/M2

## 2025-01-20 PROCEDURE — 25000003 PHARM REV CODE 250

## 2025-01-20 PROCEDURE — 99238 HOSP IP/OBS DSCHRG MGMT 30/<: CPT | Mod: ,,,

## 2025-01-20 RX ORDER — IBUPROFEN 600 MG/1
600 TABLET ORAL EVERY 6 HOURS PRN
Qty: 60 TABLET | Refills: 0 | Status: SHIPPED | OUTPATIENT
Start: 2025-01-20

## 2025-01-20 RX ADMIN — IBUPROFEN 600 MG: 600 TABLET, FILM COATED ORAL at 07:01

## 2025-01-20 RX ADMIN — ACETAMINOPHEN 650 MG: 325 TABLET, FILM COATED ORAL at 01:01

## 2025-01-20 RX ADMIN — ACETAMINOPHEN 650 MG: 325 TABLET, FILM COATED ORAL at 07:01

## 2025-01-20 RX ADMIN — PRENATAL VIT W/ FE FUMARATE-FA TAB 27-0.8 MG 1 TABLET: 27-0.8 TAB at 08:01

## 2025-01-20 RX ADMIN — OXYCODONE HYDROCHLORIDE 10 MG: 10 TABLET ORAL at 03:01

## 2025-01-20 RX ADMIN — IBUPROFEN 600 MG: 600 TABLET, FILM COATED ORAL at 01:01

## 2025-01-20 RX ADMIN — DOCUSATE SODIUM 200 MG: 100 CAPSULE, LIQUID FILLED ORAL at 08:01

## 2025-01-20 NOTE — PROGRESS NOTES
"   01/19/25 1717   Maternal Assessment   Breast Shape Left:;round   Breast Density soft   Areola elastic   Nipples everted   Left Nipple Symptoms redness   Maternal Infant Feeding   Maternal Emotional State assist needed   Infant Positioning cradle;cross-cradle   Signs of Milk Transfer audible swallow;infant jaw motion present   Pain with Feeding no   Latch Assistance yes     Baby swaddled and in a modified cradle position when LC entered room. Pt commented, "I feel like she is just on the tip." LC acknowledged Pt's assessment. Pt agreeable to receiving assistance with shifting position. Pt and baby positioned altered. Multiple attempts to obtain deep latch. Tugs and pulls observed. LC encouraged breast compression. LC reminded Pt of the importance of obtaining deep latch and reviewed basic breastfeeding education. All questions answered.  "

## 2025-01-20 NOTE — LACTATION NOTE
Lactation discharge education completed. Plan of care is for pt to follow basic breastfeeding education, frequent feeding based on baby's cues, and to monitor baby's voids and stools. Breastfeeding section, First Alert survey, resource list, and lactation warmline phone number reviewed. Pt to notify doctor for maternal or infant concerns, as reviewed with LC. Pt verbalizes understanding and questions answered.

## 2025-01-20 NOTE — PLAN OF CARE
Pt ambulating, voiding, and passing flatus. Pt tolerating PO well and no SS of distress at this time. Pt's pain well controlled throughout shift by oral pain medication. Pts bleeding has been light throughout shift. Fundus is firm. Mother baby care guide reviewed. All questions answered. Pt verbalized understanding to follow up with OB X 2 weeks (mood check) and X 6 weeks (PP visit). SW consult done and will follow up with patient outpatient.Medications delivered to bedside. Reviewed medication list, when to call provider, and SS of infection. Pt stable at this time. ID band verified. Awaiting transport.

## 2025-01-20 NOTE — DISCHARGE SUMMARY
"[facility]  Obstetrics  Discharge Summary      Patient Name: Dagmar Quach  MRN: [id number]  Admission Date: 2025  Hospital Length of Stay: 2 days  Discharge Date and Time:  2025 10:06 AM  Attending Physician: Marycarmen Adams DO   Discharging Provider: Susan Mendez CNM   Primary Care Provider: Shanice, Primary Doctor    HPI: Dagmar Quach is a 24 y.o. female  at 39w4d with Estimated Date of Delivery: 25 based on 1T sono who presents to Labor and Delivery accompanied by partner "Khalif" and  "Akgavinlouis". Expecting a girl "Vicky"!    Reports regular uterine contractions since 2330 last night. They are now 5 minutes apart and increasing in intensity and duration.  moderate contractions, active fetal movement, No vaginal bleeding.  SROM @ 2330 on 25, Meconium stained.Reports fluid was yellow/brown in color at first then became clear.     She has had regular prenatal care with [facility].     This pregnancy has been complicated by:  GBS +  History of depression: Hx of suicide attempt in 2019, no issues with depression for several years.  History IUGR in previous pregnancy    Last growth sono @ 37w6d: EFW 73%, 3526 grams, AC 96%       Presentation: VTX per bs sono in [place]  Estimated Fetal Weight: 7 1/2 lb per Leopold  3cm, 70%, -2 on admission        * No surgery found *     Hospital Course:   25: Admit to LD in active labor. GBS +    2025 PPD # 1  24 year old G P s/p . First degree laceration. QBL 50 cc. Doing well, ambulating w/o vertigo or weakness, voiding, and tolerating regular diet. Lochia is steadily decreasing. VSS, normotensive and afebrile. Breastfeeding infant w/o difficulty. PO pain meds managing postpartum discomforts. Has not had BM since birth. Normal involution. Depression/anxiety: has a hx w/ a suicide attempt in 2019. No s/s today. Support at home: partner and family. Rh +, RI (from 24 although rubella titer in progress from " hospital admission). Does not want early discharge to home today. Desired method of contraception: undecided; understands that progesterone only options are appropriate with breastfeeding. Not UTD on immunizations; declined influenza, RSV, and TdaP during pregnancy. Has not had any COVID vaccinations. Last TdaP  so is in date as a non-pregnant female now even though declined in pregnancy. Recommended influenza vaccine prior to D/C and declines. Bonding well w/ baby, who is doing well; will FU w/peds provider. Plan D/C tomorrow.     25: PPD2: Doing well, normal involution and lochia, breast/ bottle feeding without difficulty, d/c home today.   All PP warning signs and education done. Discussed when to report to [place] vs call clinic. eRX sent to pharmacy. Follow up in office x 6 weeks for routine PP visit and x 2 weeks for virtual mood check.       Doing well, ambulating, voiding, and tolerating regular diet  Denies dizziness or light headed sensation. Denies SOB or difficulty breathing.   Denies headaches, visual disturbances or upper GI pain, nausea, or vomiting.  Reports passing flatus. No BM yet.  Lochia: steadily decreasing  Pain: well controlled with Ibuprofen  Depression/anxiety: + history, mood currently stable without meds   Support at home: good  Contraception: to discuss @ PP visit  Central: baby girl is doing well, will f/u with pediatrician.     Gen: A&O x 4, NAD  CV: normal HR  Lungs: normal resp effort  Breasts: bilaterally soft, non-tender, nipples intact   Abdomen: soft, non-tender, uterus firm at U - 2 fb  Perineum: approximated, no edema   Lochia: minimal rubra  Ext: no s/s DVT  Consults (From admission, onward)          Status Ordering Provider     Inpatient consult to Social Work  Once        Provider:  (Not yet assigned)    Acknowledged ROBERTO LOVE     Inpatient consult to Anesthesiology  Once        Provider:  (Not yet assigned)    Acknowledged NATALEE FORBES     Inpatient  "consult to Lactation  Use PRN      Provider:  (Not yet assigned)    ROBERTO Resendiz            Final Active Diagnoses:    Diagnosis Date Noted POA    PRINCIPAL PROBLEM:   (spontaneous vaginal delivery) [O80] 2025 Not Applicable    Breast feeding status of mother [Z39.1] 2025 Not Applicable    First degree perineal laceration [O70.0] 2025 No    History of depression [Z86.59] 2024 Not Applicable      Problems Resolved During this Admission:    Diagnosis Date Noted Date Resolved POA    Meconium in amniotic fluid [P96.83] 2025 Yes    GBS (group B Streptococcus carrier), +RV culture, currently pregnant [O99.820] 2024 Not Applicable        Significant Diagnostic Studies: Labs: All labs within the past 24 hours have been reviewed      Feeding Method: breast    Immunizations       Date Immunization Status Dose Route/Site Given by    25 0750 MMR Incomplete 0.5 mL Subcutaneous/     25 0750 Tdap Incomplete 0.5 mL Intramuscular/             Delivery:    Episiotomy: None   Lacerations: 1st   Repair suture:     Repair # of packets: 1   Blood loss (ml):       Birth information:  YOB: 2027   Time of birth: 5:37 AM   Sex: female   Delivery type: Vaginal, Spontaneous   Gestational Age: 39w4d     Measurements    Weight: 3820 g  Weight (lbs): 8 lb 6.8 oz  Length: 52.7 cm  Length (in): 20.75"  Head circumference: 35 cm  Chest circumference: 36 cm         Delivery Clinician: Delivery Providers    Delivering clinician: Susan Mendez CNM   Provider Role    Zina Colorado RN Cunningham, Claire, RN              Additional  information:  Forceps:    Vacuum:    Breech:    Observed anomalies      Living?:     Apgars    Living status: Living  Apgar Component Scores:  1 min.:  5 min.:  10 min.:  15 min.:  20 min.:    Skin color:  1  1  1      Heart rate:  2  2  2      Reflex irritability:  1  2  2      Muscle tone:  1  2  2    "   Respiratory effort:  1  1  2      Total:  6  8  9      Apgars assigned by: NICU         Placenta: Delivered:       appearance  Pending Diagnostic Studies:       Procedure Component Value Units Date/Time    Rubella antibody, IgG [[id number]] Collected: 01/18/25 0031    Order Status: Sent Lab Status: In process Updated: 01/18/25 1059    Specimen: Blood             Discharged Condition: stable    Disposition: Home or Self Care    Follow Up:   Follow-up Information       [facility] Follow up in 2 week(s).    Specialty: Obstetrics and Gynecology  Why: Mood Check (virtual)  Contact information:  [address]th Floor  [facility] [id number] [phone]  Additional information:  Turn at Entrance 1 on [address] in [facility] and take elevators to Floor 2. Follow signs to Hospital. Take Hospital Elevators to Floor 4 for [address].             [facility] Follow up in 6 week(s).    Specialty: Obstetrics and Gynecology  Why: Routine PP visit  Contact information:  [address]  [facility] [id number] [phone]  Additional information:  Turn at Entrance 1 on [address] in [facility] and take elevators to Floor 2. Follow signs to Hospital. Take Hospital Elevators to Floor 4 for [address].                         Patient Instructions:      Diet Adult Regular     Ice to affected area     Lifting restrictions     Pelvic Rest     Notify your health care provider if you experience any of the following:  temperature >100.4     Notify your health care provider if you experience any of the following:  persistent nausea and vomiting or diarrhea     Notify your health care provider if you experience any of the following:  severe uncontrolled pain     Notify your health care provider if you experience any of the following:  redness, tenderness,  or signs of infection (pain, swelling, redness, odor or green/yellow discharge around incision site)     Notify your health care provider if you experience any of the following:  difficulty breathing or increased cough     Notify your health care provider if you experience any of the following:  severe persistent headache     Notify your health care provider if you experience any of the following:  worsening rash     Notify your health care provider if you experience any of the following:  persistent dizziness, light-headedness, or visual disturbances     Notify your health care provider if you experience any of the following:  increased confusion or weakness     Activity as tolerated     Medications:  Current Discharge Medication List        START taking these medications    Details   ibuprofen (ADVIL,MOTRIN) 600 MG tablet Take 1 tablet (600 mg total) by mouth every 6 (six) hours as needed (cramping).  Qty: 60 tablet, Refills: 0           CONTINUE these medications which have NOT CHANGED    Details   azelastine (ASTELIN) 137 mcg (0.1 %) nasal spray 2 sprays (274 mcg total) by Nasal route 2 (two) times daily.  Qty: 30 mL, Refills: 12      desloratadine (CLARINEX) 5 mg tablet Take 1 tablet (5 mg total) by mouth once daily.  Qty: 30 tablet, Refills: 2    Associated Diagnoses: Allergic state, initial encounter      fluticasone (FLONASE) 50 mcg/actuation nasal spray 1 spray (50 mcg total) by Each Nare route 2 (two) times daily.  Qty: 1 Bottle, Refills: 1    Associated Diagnoses: Allergic state, initial encounter      prenat.vits,alok,min-iron-folic (PRENATAL VITAMIN) Tab Take 1 tablet by mouth once daily.  Qty: 30 each, Refills: 8    Associated Diagnoses: 25 weeks gestation of pregnancy             Susan Mendez CNM  Obstetrics  [facility]
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
## 2025-01-21 LAB
RUBV IGG SER-ACNC: 62.3 IU/ML
RUBV IGG SER-IMP: REACTIVE

## 2025-01-24 ENCOUNTER — PATIENT MESSAGE (OUTPATIENT)
Dept: OBSTETRICS AND GYNECOLOGY | Facility: OTHER | Age: 25
End: 2025-01-24
Payer: MEDICAID

## 2025-02-05 ENCOUNTER — PATIENT MESSAGE (OUTPATIENT)
Dept: OBSTETRICS AND GYNECOLOGY | Facility: CLINIC | Age: 25
End: 2025-02-05

## 2025-02-11 ENCOUNTER — OFFICE VISIT (OUTPATIENT)
Dept: OBSTETRICS AND GYNECOLOGY | Facility: CLINIC | Age: 25
End: 2025-02-11
Payer: MEDICAID

## 2025-02-11 DIAGNOSIS — Z86.59 HISTORY OF DEPRESSION: ICD-10-CM

## 2025-02-11 NOTE — PROGRESS NOTES
Postpartum MOOD evaluation:    Dagmar Quach is a 24 y.o. female  is here for a virtual postpartum visit. Location is [place]. She agrees to virtual visit.   She is 3 weeks postpartum following a spontaneous vaginal delivery, of a live girl infant.  Appointment is for mood check today.     Significant Risk Factors for PPD:    OB History    Para Term  AB Living   3 2 2   1 2   SAB IAB Ectopic Multiple Live Births   1     0 2      # Outcome Date GA Lbr Enrico/2nd Weight Sex Type Anes PTL Lv   3 Term 25 39w4d  3.82 kg (8 lb 6.8 oz) F Vag-Spont EPI N CHANDLER   2 Term 10/05/19 38w1d / 00:06 2.608 kg (5 lb 12 oz) M Vag-Spont None N CHANDLER   1 SAB 19 7w0d    SAB          Postpartum course has been uncomplicated.    Bleeding staining only. Bowel/ bladder function is normal.   Desired contraception method is plans condoms and or natural family planning.  No issues with bowels/bladder     25 by ANNE Mendez  1st degree laceration/repaired  # 8 lb 6 oz girl at 39 + 4/7 weeks    Postpartum depression screening: negative. EPDS 8.      Baby's course has been uncomplicated. Baby is feeding by breast.     ROS:  GENERAL: No fever, chills, fatigability.  VULVAR: No pain, no lesions and no itching.  VAGINAL: No relaxation, no itching, no discharge, no abnormal bleeding and no lesions.  ABDOMEN: No abdominal pain. Denies nausea. Denies vomiting. No diarrhea. No constipation  BREAST: Denies pain. No lumps. No discharge.  URINARY: No incontinence, no nocturia, no frequency and no dysuria.  CARDIOVASCULAR: No chest pain. No shortness of breath. No leg cramps.  NEUROLOGICAL: No headaches. No vision changes.    Past Medical History:   Diagnosis Date    Allergy     Lactose intolerance     Urticaria      Past Surgical History:   Procedure Laterality Date    WISDOM TOOTH EXTRACTION       Review of patient's allergies indicates:  No Known Allergies    Current Outpatient Medications:     azelastine (ASTELIN) 137  mcg (0.1 %) nasal spray, 2 sprays (274 mcg total) by Nasal route 2 (two) times daily. (Patient not taking: Reported on 1/17/2025), Disp: 30 mL, Rfl: 12    desloratadine (CLARINEX) 5 mg tablet, Take 1 tablet (5 mg total) by mouth once daily. (Patient not taking: Reported on 1/17/2025), Disp: 30 tablet, Rfl: 2    fluticasone (FLONASE) 50 mcg/actuation nasal spray, 1 spray (50 mcg total) by Each Nare route 2 (two) times daily. (Patient not taking: Reported on 1/17/2025), Disp: 1 Bottle, Rfl: 1    ibuprofen (ADVIL,MOTRIN) 600 MG tablet, Take 1 tablet (600 mg total) by mouth every 6 (six) hours as needed (cramping)., Disp: 60 tablet, Rfl: 0    prenat.vits,alok,min-iron-folic (PRENATAL VITAMIN) Tab, Take 1 tablet by mouth once daily., Disp: 30 each, Rfl: 8      There were no vitals filed for this visit.    General appearance - alert, well appearing, and in no distress and oriented to person, place, and time  Mental status - alert, oriented to person, place, and time, normal mood, behavior, speech, dress, motor activity, and thought processes  Neuro: Alert, oriented x 3 in no apparent distress,      Diagnoses and all orders for this visit:    Postpartum state    History of depression- no current meds. Feels she is doing well.       At risk for PPD:   Patient denies homicidal or suicidal ideations. Counseling encouraged and references offered.   Educated about adjunct treatments including but not limited to avoidance of stimulants including caffeine, participation in regular exercise, yoga, meditation, and breathing exercises and daily exposure to sun/light therapy. Vitamin B6 daily may be helpful in some cases and safe to take on regular basis, and/or B-compled.  Also discussed setting aside at least 1hr/week for which she has to herself (without childcare or other responsibilities) for which she can participate in self-care.   PP Community resources/ counseling references discussed.   [facility] support groups Encouraged:    Brittany and [place] @ [facility]    [employer] @ [employer]    [facility] [facility]       Follow up for routine PP visit, has scheduled. F/U latanya, samina.    Chelsi Rodríguez CNM

## 2025-03-05 ENCOUNTER — CLINICAL SUPPORT (OUTPATIENT)
Dept: REHABILITATION | Facility: OTHER | Age: 25
End: 2025-03-05
Payer: MEDICAID

## 2025-03-05 DIAGNOSIS — M62.81 WEAKNESS OF TRUNK MUSCULATURE: ICD-10-CM

## 2025-03-05 DIAGNOSIS — M54.2 PAIN OF NECK WITH RECENT TRAUMATIC INJURY: ICD-10-CM

## 2025-03-05 DIAGNOSIS — R29.898 DECREASED RANGE OF MOTION OF NECK: ICD-10-CM

## 2025-03-05 DIAGNOSIS — M53.82 NECK MUSCLE WEAKNESS: ICD-10-CM

## 2025-03-05 DIAGNOSIS — N94.10 DYSPAREUNIA, FEMALE: Primary | ICD-10-CM

## 2025-03-05 PROCEDURE — 97161 PT EVAL LOW COMPLEX 20 MIN: CPT | Mod: PN | Performed by: PHYSICAL THERAPIST

## 2025-03-05 PROCEDURE — 97110 THERAPEUTIC EXERCISES: CPT | Mod: PN | Performed by: PHYSICAL THERAPIST

## 2025-03-05 NOTE — PROGRESS NOTES
Outpatient Rehab   Physical Therapy Evaluation     Patient Name: Dagmar Quach  MRN: [id number]  YOB: 2000  Today's Date: 3/5/2025    Therapy Diagnosis:   Encounter Diagnoses   Name Primary?    Dyspareunia, female Yes    Weakness of trunk musculature     Pain of neck with recent traumatic injury     Decreased range of motion of neck     Neck muscle weakness      Referring Provider: Belén Hidalgo CNM    Referring Provider Orders: Eval and Treat  Medical Diagnosis from Referral: Postpartum care and examination [Z39.2]   Visit # / Visits Authorized: 1/pending  Date of Evaluation:  3/5/2025   Insurance Authorization Period: expires 12/31/2025  Plan of Care Certification: 3/5/2025 to 6/5/2025     Time In: 11:20  Time Out: 12:00  Total Time: 40 minutes  Total Billable Time: 40 minutes      Subjective  History of Present Illness  Dagmar is a 24 y.o. female who reports to physical therapy with a chief concern of dyspareunia, core weakness, and neck/back pain. According to the patient's chart, Dagmar has a past medical history of Allergy, Lactose intolerance, and Urticaria. Dagmar has a past surgical history that includes Home tooth extraction.    The patient reports a medical diagnosis of Postpartum care and examination [Z39.2]   Diagnostic tests related to this condition: none    History of Present Condition/Illness: Dagmar reports dyspareunia, core weakness, and neck/back pain following vaginal delivery on 1/18/2025. She reports continued neck/back pain from prior to delivery - with increased intensity and more paresthesia.    OB/Gyn History - 2 vaginal deliveries, grade 1 perineal tearing with most recent delivery    Bladder History  Bladder leakage: No  Difficulty initiating urine stream: No  Incomplete emptying: No    Bowel History  Comments: was constipated for a while after delivery, but better now    Sexual/Pain History  Sexually active? Yes   Pain with vaginal exams, intercourse or tampon use?  Yes (deep vaginal penetration)    Pain  Patient reports a current pain level of 5/10. Pain at best is reported as 2/10. Pain at worst is reported as 9-10/10.   Location: neck and lower back  Clinical Progression (since onset): increasing  Pain Qualities: pins and needles, shocking, achy  Pain-Relieving Factors: hot baths, stretching, ibuprofen  Pain-Aggravating Factors: side-lying, turning head, lifting    Treatment History  No prior pelvic floor therapy, was in orthopedic physical therapy prior to delivery for neck pain     Employment  Returning to work in a few weeks    Current exercise  Old ortho HEP       Objective   Pt provided verbal consent for evaluation.  Chaperone: declined  *Assessment truncated as pt brought her young child, so difficult to perform extensive tests and measures, including pelvic floor muscle assessment    Abdominal Wall Assessment  Palpation: no tenderness or tension  Pelvic Girdle Stability: Pt demonstrates moderately impaired ability to stabilize pelvis with Active Straight Leg Raise Test.   Scarring: absent  Diastasis Recti: present, 1-finger width, present with supine curl-up task  Motor Control: oblique-dominant    Breathing Mechanics Assessment  Thorax Assessment During Quiet Respiration: Decreased excursion of abdominal wall   Thorax Assessment During Deep Respiration: Decreased excursion of abdominal wall     Cervical Range of Motion    Active (deg) Passive (deg) Pain   Flexion 45      Extension 25  x   Right Lateral Flexion 45      Right Rotation 75      Left Lateral Flexion 35      Left Rotation 60  x         Intake Outcome Measure for FOTO Survey  Therapist reviewed FOTO scores for Dagmar Quach on 3/5/2025.   FOTO report - see Media section or FOTO account episode details.           Treatment   (TrA = transverse abdominis)   All interventions billed as Therapeutic Exercise, per Medicaid guidelines.     Therapeutic exercise  Patient Education: pt prognosis, PT plan of care,  relationship between TrA & PFM, relationship between pelvic floor dysfunction & lumbar spine/hip/pelvic girdle dysfunction, hormonal changes during pregnancy/nursing & impact on ligamentous laxity, timeline for muscle changes with consistent strength training, return to exercise following vaginal delivery, and abdominal wall anatomy  HEP building/HEP review  TrA brace without oblique activation - able to demonstrate correctly with verbal and tactile cues  TrA brace + supine march (R&L) - painful, discontinued  Bent knee fall outs (R&L), x15  Quadruped TrA brace, x10 with 3-second hold  Grade V mid-thoracic manipulation  Seated bilateral shoulder external rotation with red band, x20      Assessment & Plan     Dagmar presents with a condition of low complexity.   Presentation of Symptoms: Stable  Will Comorbidities Impact Care: No     Functional Limitations: activity tolerance, pain with ADLs, sexual function, standing tolerance, walking tolerance, functional mobility, participating in leisure activities, caring for children, and performing household activities  Impairments: abnormal/restricted range of motion, impaired physical strength, and lack of appropriate home exercise program  Personal Factors Affecting Prognosis:  parent to      Patient Goal for Therapy (PT): improve pain and strength  Prognosis: good    Assessment Details: Dagmar is a 24 y.o. female referred to outpatient physical therapy with a medical diagnosis of Postpartum care and examination [Z39.2]  and additional complaints of neck/back pain, dyspareunia, and generalized weakness. Symptoms impair the patient's ability to perform ADLs and functional mobility.  No direct pelvic floor examination performed today, but pt presentation and subjective report suggest pelvic floor dysfunction, particularly tension/tenderness in layer 3 leading to dyspareunia.  Pt presents with decreased cervical spine range of motion, diastasis recti and poor trunk  stability.  Neck and arm symptoms still appear consistent with cervical motor coordination deficits with underlying adverse neural tension of the Right median nerve and hypomobility of the upper thoracic spine, for which she received treatment in late pregnancy. Ortho PT will reassess next week.  Pt will benefit from pelvic floor muscle training, core/hip strengthening, patient education, manual therapy interventions, and functional retraining.      Goals  Long Term Goals - Start:  3/5/2025    Expected End:  6/5/2025   Pt to demonstrate pain-free range of motion of cervical spine within functional limits (Progressing)  Pt to report pain of no more than 2/10 in the neck/back with regular activity (Progressing)  Pt to be able to lift 20# from the floor without significant back/neck pain for improved ability to care for her children (Progressing)  Pt to score no more than 4% impairment on the PFDI Pain FOTO survey to demonstrate reduced impairment due to pelvic floor dysfunction (Progressing)  Pt to be independent with advanced home exercise program (Progressing)      Plan  From a physical therapy perspective, the patient would benefit from: Skilled Rehab Services     Planned therapy interventions include: Therapeutic exercise, Therapeutic activities, Neuromuscular re-education, Manual therapy, ADLs/IADLs   Planned modalities to include: Electrical stimulation - attended and Electrical stimulation - passive/unattended.         Visit Frequency: 2 times Per Week for 12 Weeks.        This plan was discussed with Patient.   Discussion participants: Agreed Upon Plan of Care        Sheri Fitzpatrick, PT, DPT    Board-Certified Clinical Specialist in Women's Health Physical Therapy

## 2025-03-06 NOTE — PATIENT INSTRUCTIONS
Supine bent knee fall out (BKFO), x20-30  - Holding the hips level, gently drop one hip to the side (but only as far as you can control)  - Inhale again to rest, then alternate legs  *Don't hold your breath  *Imagine disconnecting hip movement from the rest of the body, so your back doesn't move while the leg is moving.       Quadruped core activation, 2-3 sets of 10 with 5-second hold  - Inhale to prepare and relax the belly  - Exhale and pull the lower belly up towards the spine  *Hold spine still, not moving with core contraction       Diaphragmatic breathing - focus on circumferential expansion. Can do sitting or laying on back. Should feel like your lower ribs expand in all directions as the abdominals and low back also gently expand and fill with the breath. The pelvic floor should gently drop away from you on inhalation. If you are sitting, you may feel like the anus is floating down towards the seat. Perform 10-20 breaths in a row, or more. Do 3-5 sets of this breathing throughout the day        Ideas for Pelvic Floor Relaxation and Lengthening  Visualize the pelvic floor opening like a flower  Imagine filling up a balloon with air and expand pelvic floor, ribs and abdominal wall together    Visualize your pelvic floor as a trampoline sinking towards the ground under the weight of several kids   Unpucker your anus    Think about your sit bones widening and spreading   Think about the area behind your pubic bone softening    Soften your pelvic floor muscles    Imagine the pelvic floor as being warm and heavy     *Keep up with Joao's HEP

## 2025-03-10 ENCOUNTER — POSTPARTUM VISIT (OUTPATIENT)
Dept: OBSTETRICS AND GYNECOLOGY | Facility: CLINIC | Age: 25
End: 2025-03-10
Payer: MEDICAID

## 2025-03-10 ENCOUNTER — CLINICAL SUPPORT (OUTPATIENT)
Dept: REHABILITATION | Facility: OTHER | Age: 25
End: 2025-03-10
Payer: MEDICAID

## 2025-03-10 VITALS
WEIGHT: 184.06 LBS | BODY MASS INDEX: 30.67 KG/M2 | DIASTOLIC BLOOD PRESSURE: 53 MMHG | HEIGHT: 65 IN | HEART RATE: 84 BPM | SYSTOLIC BLOOD PRESSURE: 115 MMHG

## 2025-03-10 DIAGNOSIS — M53.82 NECK MUSCLE WEAKNESS: ICD-10-CM

## 2025-03-10 DIAGNOSIS — N94.10 DYSPAREUNIA, FEMALE: ICD-10-CM

## 2025-03-10 DIAGNOSIS — Z91.89 AT RISK FOR DEPRESSED MOOD DURING POSTPARTUM PERIOD: ICD-10-CM

## 2025-03-10 DIAGNOSIS — R29.898 DECREASED RANGE OF MOTION OF NECK: ICD-10-CM

## 2025-03-10 DIAGNOSIS — M62.81 WEAKNESS OF TRUNK MUSCULATURE: Primary | ICD-10-CM

## 2025-03-10 PROCEDURE — 97110 THERAPEUTIC EXERCISES: CPT | Mod: PN

## 2025-03-10 PROCEDURE — 99213 OFFICE O/P EST LOW 20 MIN: CPT | Mod: PBBFAC

## 2025-03-10 PROCEDURE — 99999 PR PBB SHADOW E&M-EST. PATIENT-LVL III: CPT | Mod: PBBFAC,,,

## 2025-03-10 PROCEDURE — 97164 PT RE-EVAL EST PLAN CARE: CPT | Mod: PN
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
## 2025-03-10 NOTE — PROGRESS NOTES
Outpatient Rehab    Physical Therapy Progress Note : Updated Plan of Care    Patient Name: Dagmar Quach  MRN: [id number]  YOB: 2000  Encounter Date: 3/10/2025    Therapy Diagnosis:   Encounter Diagnoses   Name Primary?    Weakness of trunk musculature Yes    Decreased range of motion of neck     Neck muscle weakness     Dyspareunia, female      Physician: Belén Hidalgo, *    Physician Orders: Eval and Treat  Medical Diagnosis: Postpartum care and examination [Z39.2]     Visit # / Visits Authorized:  1 / 20  Date of Evaluation: 3/10/2025  Insurance Authorization Period: 3/5/2025 to 3/5/2026  Plan of Care Certification:  3/10/2025 to 6/5/2025      Time In: 1045   Time Out: 1140  Total Time: 55   Total Billable Time: 55 minutes    FOTO:  Intake Score:  %  Survey Score 1:  %  Survey Score 2:  %         Subjective   That she is getting back and neck pain.  Pain reported as 4/10.      Objective      Cervical Thoracic Sensation  General Cervical/Thoracic Sensation  Intact: Right and Left  Right Cervical/Thoracic Sensation  Intact: Light Touch, Static Two Point Discrimination, Dynamic Two Point Discrimination, Sharp/Dull Discrimination, Kinesthesia, and Proprioception       Left Cervical/Thoracic Sensation  Intact: Light Touch, Static Two Point Discrimination, Dynamic Two Point Discrimination, Sharp/Dull Discrimination, Kinesthesia, and Proprioception            Lower Extremity Sensation  General Lumbar/Lower Extremity Sensation  Intact: Right and Left  Right Lumbar/Lower Extremity Sensation  Intact: Light Touch, Sharp/Dull Discrimination, Static Two Point Discrimination, Dynamic Two Point Discrimination, Kinesthesia, and Proprioception  Right Lumbar/Lower Extremity Sensation Stocking Glove Pattern: No    Left Lumbar/Lower Extremity Sensation  Intact: Light Touch, Static Two Point Discrimination, Dynamic Two Point Discrimination, Sharp/Dull Discrimination, Kinesthesia, and Proprioception  Left  Lumbar/Lower Extremity Sensation Stocking Glove Pattern: No             Right Upper Extremity Reflexes  Deltoid, C5: Normal (2+)    Biceps, C5-C6: Normal (2+)    Brachioradialis, C6: Normal (2+)    Triceps, C7: Normal (2+)    Desai's Sign: No    Left Upper Extremity Reflexes  Deltoid, C5: Normal (2+)    Biceps, C5-C6: Normal (2+)    Brachioradialis, C6: Normal (2+)    Triceps, C7: Normal (2+)    Desai's Sign: No    Right Lower Extremity Reflexes  Patellar, L4: Normal (2+)    Hamstring, L5: Normal (2+)    Achilles, S1: Normal (2+)    Babinski reflex Absent.    Left Lower Extremity Reflexes  Patellar, L4: Normal (2+)     Hamstring, L5: Normal (2+)    Achilles, S1: Normal (2+)    Babinski reflex Absent.        Subcranial Range of Motion   Active Restricted? Passive Restricted? Pain   Flexion Yes Yes Yes   Protraction No No     Retraction Yes Yes Yes     Cervical Range of Motion   Active (deg) Passive (deg) Pain   Flexion 45 55 Yes   Extension 30 35 Yes   Right Lateral Flexion 20 25     Right Rotation 70 75 Yes   Left Lateral Flexion 20 25     Left Rotation 70 75 Yes            Thoracic Range of Motion              Lumbar Range of Motion   Active (deg) Passive (deg) Pain   Flexion 30 35 Yes   Extension 15 20 Yes   Right Lateral Flexion 15 20     Right Rotation 30 35     Left Lateral Flexion 15 20     Left Rotation 30 35                   Subcranial Strength   Strength Pain   Subcranial Flexion 4-     Subcranial Retraction 4-         Cervical Strength   Strength Pain   Flexion (C1/C2) 4-     Extension 4-     Right Lateral Flexion (C3) 4     Left Lateral Flexion (C3) 4     Right Rotation 4     Left Rotation 4         Lumbar Strength   Strength Pain   Flexion 4-     Extension 4-     Right Lateral Flexion 3+     Left Lateral Flexion 3+     Right Rotation 3+     Left Rotation 3+     Transverse Abdominus Strength Testing: 3/5       Shoulder Strength - Planes of Motion   Right Strength Right Pain Left Strength Left  Pain    Flexion 4+   4     Extension 4+   4     ABduction 4+   4     ADduction 4+   4     Horizontal ABduction           Horizontal ADduction           Internal Rotation 0° 4+   4     Internal Rotation 90°           External Rotation 0° 4+   4     External Rotation 90°                    Hip Strength - Planes of Motion   Right Strength Right Pain Left Strength Left  Pain   Flexion (L2) 3+   3+     Extension 3+   3+     ABduction 3+   3+     ADduction 3+   3+     Internal Rotation 3+   3+     External Rotation 3+   3+             Nystagmus and Associated Symptom Provocative Tests  Negative: Valsalva           Cervical Screen  Tests  Negative: CPR for Cervical Radiculopathy, Right Spurling's A, Left Spurling's A, Right Spurling's B, Left Spurling's B, Right Distraction, Left Distraction, Right ULTT2a (Median Variation), and Left ULTT2a (Median Variation)  Cervical Range of Motion  Less than 60 degrees rotation to involved side? No  Flexion WNL? No  Extension WNL? No  Thoracic Range of Motion  Flexion WNL? No  Extension WNL? No       Cervical/Thoracic Special Tests            Cervical Tests  Negative: Right Distraction, Left Distraction, Right Spurling's A, and Left Spurling's A  Negative: Right Spurling's B, Left Spurling's B, Right ULTT2a (Median Variation), and Left ULTT2a (Median Variation)       Thoracic Tests  Positive: Slump  Negative: Repeated Flexion, Repeated Extension, Right Repeated Lateral Bending, Right Repeated Rotation, Left Repeated Lateral Bending, and Left Repeated Rotation         Lumbar/Pelvic Girdle Special Tests  Lumbar Tests - Repeated  Negative: Flexion, Extension, Right Side Glide, and Left Side Glide       Lumbar Tests - SLR and Tension  Positive: Right Passive Straight Leg Raise and Left Passive Straight Leg Raise  Negative: Right Crossed Straight Leg Raise, Left Crossed Straight Leg Raise, Right Sural Nerve Bias Straight Leg Raise, Left Sural Nerve Bias Straight Leg Raise, Right Tibial Nerve Bias  Straight Leg Raise, Left Tibial Nerve Bias Straight Leg Raise, Right Femoral Nerve Tension, and Left Femoral Nerve Tension       Other Lumbar Tests  Negative: Left Peroneal Nerve Tension, Right Peroneal Nerve Tension, Lumbar Vertical Compression, Valsalva, Milgram's, Right Quadrant, Left Quadrant, Right Cedeño, and Left Cedeño              Shoulder Special Tests  Shoulder Neural Tension Tests  Negative: Right ULTT2a (Median Variation) and Left ULTT2a (Median Variation)       Elbow Special Tests  Elbow Neural Tension Tests  Negative: Right ULTT2a (Median Variation) and Left ULTT2a (Median Variation)       Wrist/Hand Special Tests  Upper Limb Tension Tests  Negative: Right ULTT2a (Median Variation) and Left ULTT2a (Median Variation)         Hip Special Tests  Other Hip Special Tests  Negative: Right Femoral Nerve Tension and Left Femoral Nerve Tension                Treatment:  Manual Therapy  Manual Therapy Activity 1: Supine Thoracic HVLA  Manual Therapy Activity 2: Right C2/3 HVLA              Assessment & Plan   Assessment  Dagmar presents with a condition of Moderate complexity.   Presentation of Symptoms: Changing  Will Comorbidities Impact Care: No       Functional Limitations: Activity tolerance, Bed mobility, Carrying objects, Fine motor coordination, Gait limitations, Painful locomotion/ambulation, Performing household chores, Range of motion, Squatting, Sitting tolerance, Standing tolerance  Impairments: Abnormal gait, Activity intolerance, Impaired physical strength    Patient Goal for Therapy (PT): Improve her low back and neck pain  Prognosis: Good  Prognosis Details: Good candidate for Physical therapy.  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 Patient will benefit from skilled therapy services to address impairments listed above.     Plan  From a physical therapy perspective, the patient would benefit from: Skilled  Rehab Services    Planned therapy interventions include: Therapeutic exercise, Therapeutic activities, Neuromuscular re-education, and Manual therapy.    Planned modalities to include: Biofeedback.        Visit Frequency: 1 times Per Week for 12 Weeks.       This plan was discussed with Patient.   Discussion participants: Agreed Upon Plan of Care  Plan details: Patient will be continually re-assessed and progressed to meet short/long term goals.          Patient will continue to benefit from skilled outpatient physical therapy to address the deficits listed in the problem list box on initial evaluation, provide pt/family education and to maximize pt's level of independence in the home and community environment.     Patient's spiritual, cultural, and educational needs considered and patient agreeable to plan of care and goals.     Education  Education was done with Patient. The patient's learning style includes Demonstration. The patient Demonstrates understanding and Verbalizes understanding.         Patient provided education on the importance of compliance with HEP, diagnosis and prognosis, and intended duration/frequency of physical therapy plan of care. Shared-decision making between evaluating therapist and patient utilized to determine therapeutic intervention selection, plan of care parameters, and continued monitoring of signs/symptoms.        Goals:   Active       Functional outcome       Patient will show a significant change in FOTO patient-reported outcome tool to demonstrate subjective improvement       Start:  03/10/25    Expected End:  06/02/25            Patient stated goal: Improve her low back and neck pain        Start:  03/10/25    Expected End:  06/02/25            Patient will demonstrate independence in home program for support of progression       Start:  03/10/25    Expected End:  06/02/25               Pain       Patient will report pain of 2/10 demonstrating a reduction of overall pain        Start:  03/10/25    Expected End:  06/02/25            Patient will report a 2 point reduction in pain while performing ADLs       Start:  03/10/25    Expected End:  06/02/25               Range of Motion       Patient will achieve cervical flexion ROM 60 degrees       Start:  03/10/25    Expected End:  06/02/25            Patient will achieve full cervical retraction without pain       Start:  03/10/25    Expected End:  06/02/25            Patient will achieve spinal flexion to reduce pain with lifting/carrying.       Start:  03/10/25    Expected End:  06/02/25            Patient will achieve spinal extension to 30 degrees without pain radiating to Les       Start:  03/10/25    Expected End:  06/02/25               Strength       Patient will demonstrate 4/5 cervical strength       Start:  03/10/25    Expected End:  06/02/25            Patient will demonstrate 5/5 spinal strength       Start:  03/10/25    Expected End:  06/02/25                Joao Matt PT, DPT

## 2025-03-10 NOTE — PATIENT INSTRUCTIONS
"Access Code: [email or web address]  URL: [email or web address]  Date: 03/10/2025  Prepared by: Joao Matt    Exercises  - Quadruped Rocking Backward  - 1-2 x daily - 5-7 x weekly - 3 sets - 10 reps - 3-5 seconds hold  - Cat Cow  - 3-4 x daily - 5-7 x weekly - 1-3 sets - 8-10 reps - 3" hold  - Supine Sciatic Nerve Glide  - 3-4 x daily - 5-7 x weekly - 1-3 sets - 8-10 reps - 3" hold  "

## 2025-03-17 NOTE — PROGRESS NOTES
"  Outpatient Rehab    Physical Therapy Treatment Note    Patient Name: Dagmar Quach  MRN: [id number]  YOB: 2000  Encounter Date: 3/18/2025    Therapy Diagnosis:   Encounter Diagnoses   Name Primary?    Weakness of trunk musculature Yes    Decreased range of motion of neck     Neck muscle weakness     Dyspareunia, female        Physician: Belén Hidalgo, *    Physician Orders: Eval and Treat  Medical Diagnosis: Postpartum care and examination [Z39.2]     Visit # / Visits Authorized:  2 / 20  Date of Evaluation: 3/10/2025  Insurance Authorization Period: 3/5/2025 to 3/5/2026  Plan of Care Certification:  3/10/2025 to 6/5/2025      Time In: 0956   Time Out: 1051  Total Time: 55   Total Billable Time: 55 minutes    FOTO:  Intake Score:  %  Survey Score 1:  %  Survey Score 2:  %         Subjective   That she is getting back and neck pain related to lifting.  Pain reported as 4/10.      Objective            Treatment:  Manual Therapy  MT 1: Supine Thoracic HVLA  MT 2: Right C2/3 HVLA        Therapeutic Exercise  TE 1: Supine Chin Tuck Head lift 2 x 10 reps  TE 2: Quadruped Rocking 2 x 10 reps  TE 3: Cat Cow 2 x 10 reps  TE 4: Supine Sciatic Nerve glides 30x's each  TE 5: Bridges 2 x 10 reps  TE 6: Chin Tuck with Biofeedback 10 x 10" hold    Assessment & Plan      Patient Goal for Therapy (PT): Improve her low back and neck pain  Prognosis: Good  Prognosis Details: Good candidate for Physical therapy.  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 Patient will benefit from skilled therapy services to address impairments listed above  Patient will continue to benefit from skilled outpatient physical therapy to address the deficits listed in the problem list box on initial evaluation, provide pt/family education and to maximize pt's level of independence in the home and community environment.     Patient's spiritual, " cultural, and educational needs considered and patient agreeable to plan of care and goals.     Education  Education was done with Patient. The patient's learning style includes Demonstration. The patient Demonstrates understanding and Verbalizes understanding.         Patient provided education on the importance of compliance with HEP, diagnosis and prognosis, and intended duration/frequency of physical therapy plan of care. Shared-decision making between evaluating therapist and patient utilized to determine therapeutic intervention selection, plan of care parameters, and continued monitoring of signs/symptoms.     Goals:   Active       Functional outcome       Patient will show a significant change in FOTO patient-reported outcome tool to demonstrate subjective improvement (Progressing)       Start:  03/10/25    Expected End:  06/02/25            Patient stated goal: Improve her low back and neck pain  (Progressing)       Start:  03/10/25    Expected End:  06/02/25            Patient will demonstrate independence in home program for support of progression (Progressing)       Start:  03/10/25    Expected End:  06/02/25               Pain       Patient will report pain of 2/10 demonstrating a reduction of overall pain (Progressing)       Start:  03/10/25    Expected End:  06/02/25            Patient will report a 2 point reduction in pain while performing ADLs (Progressing)       Start:  03/10/25    Expected End:  06/02/25               Range of Motion       Patient will achieve cervical flexion ROM 60 degrees (Progressing)       Start:  03/10/25    Expected End:  06/02/25            Patient will achieve full cervical retraction without pain (Progressing)       Start:  03/10/25    Expected End:  06/02/25            Patient will achieve spinal flexion to reduce pain with lifting/carrying. (Progressing)       Start:  03/10/25    Expected End:  06/02/25            Patient will achieve spinal extension to 30 degrees  without pain radiating to Les (Progressing)       Start:  03/10/25    Expected End:  06/02/25               Strength       Patient will demonstrate 4/5 cervical strength (Progressing)       Start:  03/10/25    Expected End:  06/02/25            Patient will demonstrate 5/5 spinal strength (Progressing)       Start:  03/10/25    Expected End:  06/02/25                Joao Matt PT, DPT

## 2025-03-18 ENCOUNTER — CLINICAL SUPPORT (OUTPATIENT)
Dept: REHABILITATION | Facility: OTHER | Age: 25
End: 2025-03-18
Payer: MEDICAID

## 2025-03-18 DIAGNOSIS — M53.82 NECK MUSCLE WEAKNESS: ICD-10-CM

## 2025-03-18 DIAGNOSIS — R29.898 DECREASED RANGE OF MOTION OF NECK: ICD-10-CM

## 2025-03-18 DIAGNOSIS — M62.81 WEAKNESS OF TRUNK MUSCULATURE: Primary | ICD-10-CM

## 2025-03-18 DIAGNOSIS — N94.10 DYSPAREUNIA, FEMALE: ICD-10-CM

## 2025-03-18 PROCEDURE — 97110 THERAPEUTIC EXERCISES: CPT | Mod: PN

## 2025-03-25 ENCOUNTER — CLINICAL SUPPORT (OUTPATIENT)
Dept: REHABILITATION | Facility: OTHER | Age: 25
End: 2025-03-25
Payer: MEDICAID

## 2025-03-25 DIAGNOSIS — N94.10 DYSPAREUNIA, FEMALE: ICD-10-CM

## 2025-03-25 DIAGNOSIS — R29.898 DECREASED RANGE OF MOTION OF NECK: ICD-10-CM

## 2025-03-25 DIAGNOSIS — M62.81 WEAKNESS OF TRUNK MUSCULATURE: Primary | ICD-10-CM

## 2025-03-25 DIAGNOSIS — M53.82 NECK MUSCLE WEAKNESS: ICD-10-CM

## 2025-03-25 PROCEDURE — 97110 THERAPEUTIC EXERCISES: CPT | Mod: PN

## 2025-03-25 NOTE — PROGRESS NOTES
"  Outpatient Rehab    Physical Therapy Treatment Note    Patient Name: Dagmar Quach  MRN: [id number]  YOB: 2000  Encounter Date: 3/25/2025    Therapy Diagnosis:   Encounter Diagnoses   Name Primary?    Weakness of trunk musculature Yes    Decreased range of motion of neck     Neck muscle weakness     Dyspareunia, female          Physician: Belén Hidalgo, *    Physician Orders: Eval and Treat  Medical Diagnosis: Postpartum care and examination [Z39.2]     Visit # / Visits Authorized:  3 / 20  Date of Evaluation: 3/10/2025  Insurance Authorization Period: 3/5/2025 to 3/5/2026  Plan of Care Certification:  3/10/2025 to 6/5/2025      Time In: 0947   Time Out: 1045  Total Time: 58   Total Billable Time: 55 minutes    FOTO:  Intake Score:  %  Survey Score 1:  %  Survey Score 2:  %         Subjective   That she is doing okay, feeling her pain in her neck and back.  Pain reported as 4/10.      Objective     Treatment:  Manual Therapy  MT 1: Supine Thoracic HVLA  MT 2: Right C2/3 HVLA    Therapeutic Exercise  TE 1: Supine Chin Tuck Head lift 2 x 10 reps  TE 2: Quadruped Rocking 2 x 10 reps  TE 3: Cat Cow 2 x 10 reps  TE 4: Supine Sciatic Nerve glides 30x's each  TE 5: Bridges 2 x 10 reps  TE 6: Chin Tuck with Biofeedback 10 x 10" hold  TE 7: Wall Slides with Shrugs 20x's  TE 8: Banded Rows 3 x 10 reps    Assessment & Plan      Patient Goal for Therapy (PT): Improve her low back and neck pain  Prognosis: Good  Prognosis Details: Good candidate for Physical therapy.  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 Patient will benefit from skilled therapy services to address impairments listed above  Patient will continue to benefit from skilled outpatient physical therapy to address the deficits listed in the problem list box on initial evaluation, provide pt/family education and to maximize pt's level of independence in " the home and community environment.     Patient's spiritual, cultural, and educational needs considered and patient agreeable to plan of care and goals.     Education  Education was done with Patient. The patient's learning style includes Demonstration. The patient Demonstrates understanding and Verbalizes understanding.         Patient provided education on the importance of compliance with HEP, diagnosis and prognosis, and intended duration/frequency of physical therapy plan of care. Shared-decision making between evaluating therapist and patient utilized to determine therapeutic intervention selection, plan of care parameters, and continued monitoring of signs/symptoms.       Goals:   Active       Functional outcome       Patient will show a significant change in FOTO patient-reported outcome tool to demonstrate subjective improvement (Progressing)       Start:  03/10/25    Expected End:  06/02/25            Patient stated goal: Improve her low back and neck pain  (Progressing)       Start:  03/10/25    Expected End:  06/02/25            Patient will demonstrate independence in home program for support of progression (Progressing)       Start:  03/10/25    Expected End:  06/02/25               Pain       Patient will report pain of 2/10 demonstrating a reduction of overall pain (Progressing)       Start:  03/10/25    Expected End:  06/02/25            Patient will report a 2 point reduction in pain while performing ADLs (Progressing)       Start:  03/10/25    Expected End:  06/02/25               Range of Motion       Patient will achieve cervical flexion ROM 60 degrees (Progressing)       Start:  03/10/25    Expected End:  06/02/25            Patient will achieve full cervical retraction without pain (Progressing)       Start:  03/10/25    Expected End:  06/02/25            Patient will achieve spinal flexion to reduce pain with lifting/carrying. (Progressing)       Start:  03/10/25    Expected End:  06/02/25             Patient will achieve spinal extension to 30 degrees without pain radiating to Les (Progressing)       Start:  03/10/25    Expected End:  06/02/25               Strength       Patient will demonstrate 4/5 cervical strength (Progressing)       Start:  03/10/25    Expected End:  06/02/25            Patient will demonstrate 5/5 spinal strength (Progressing)       Start:  03/10/25    Expected End:  06/02/25                Joao Matt PT, GALLOT

## 2025-03-31 NOTE — PROGRESS NOTES
Outpatient Rehab  Physical Therapy Visit    Patient Name: Dagmar Quach  MRN: [id number]  YOB: 2000  Encounter Date: 4/1/2025    Therapy Diagnosis:   Encounter Diagnoses   Name Primary?    Weakness of trunk musculature Yes    Decreased range of motion of neck     Neck muscle weakness     Dyspareunia, female      Referring Provider: Belén Hidalgo CNM    Referring Provider Orders: Eval and Treat  Medical Diagnosis: Postpartum care and examination [Z39.2]      Visit # / Visits Authorized:  5 / 20  Date of Evaluation: 3/10/2025  Insurance Authorization Period: 3/5/2025 to 3/5/2026  Plan of Care Certification:  3/10/2025 to 6/5/2025       Time In: 10:00    Time Out: 10:28  Total Time: 28 minutes  Total Billable Time: 28 minutes    Precautions: standard      Subjective     Dagmar reports resolved dyspareunia, but she is still interested in a pelvic floor assessment. She has been doing kegels.  She was compliant with home exercise program.    Pain: none pertinent to pelvic floor      Objective    External Pelvic Floor Muscle Assessment  Introitus: WNL  Skin condition: WNL  Scarring: none visible   Sensation: WNL   Voluntary contraction: visible lift  Voluntary relaxation: visible drop  Bearing down: bulge     Internal Pelvic Floor Muscle Assessment  Palpation: tenderness to palpation of B layer 3 and obturator internus  Sensation: able to localized pressure appropriately   Vaginal vault: WNL   Muscle Bulk: increased tone  Muscle Power: 4/5  Muscle Endurance: 10 seconds  Quality of contraction: slow relaxation and incomplete relaxation       Treatment:  (TrA = transverse abdominis, PFM = pelvic floor muscle, sEMG = surface electromyography, RUSI = Rehabilitative Ultrasound Imaging)  Pt consents to pelvic floor muscle assessment and treatment.  All interventions billed as Therapeutic Exercise, per Medicaid guidelines.     Therapeutic exercises   [x] Patient education - pt prognosis, PT plan of care,  pelvic floor anatomy & function, pelvic floor muscle assessment, consequences of elevated pelvic floor muscle tension, relationship between TrA & PFM, relationship between hips & PFM, relationship between pelvic floor dysfunction & lumbar spine/hip/pelvic girdle dysfunction, and changes to abdominal wall/pelvic floor with pregnancy, pelvic floor relaxation techniques  [x] Pelvic floor assessment - see above  [x] HEP building/HEP review  [x] PFM squeeze + bridging with belt around knees, 2x10 with focus on full relaxation between reps  [x] PFM squeeze + hooklying ball squeeze between knees, 2x10 with focus on full relaxation between reps  [x] PFM relaxation with verbal and intravaginal tactile cues  [x] PFM coordination drills, x10 - verbal and intravaginal tactile cues provided for full relaxation between reps  [x] Supine butterfly stretch + diaphragmatic breathing and pelvic drop, 2 minutes      Patient Education: progression of plan of care, plan for next session, see above  Education was done with Patient. The patient's learning style includes Demonstration. The patient Demonstrates understanding and Verbalizes understanding.         Home Exercise Program Updates  4/1/25: focus on full pelvic floor relaxation after kegels and other exercise    Exercises were reviewed, and Dagmar was able to demonstrate them prior to the end of the session, as needed. Damgar demonstrated good understanding of the education provided.   See 'Patient Instructions' for exercises/instructions provided.      Assessment & Plan     Assessment:  Pt tolerated treatment session well, with good understanding of education provided and no increased symptoms with exercise. Pt presents with excellent strength in the pelvic floor, but she has some tension and tenderness that improved with downtraining.     Treatment Tolerance: Patient tolerated treatment well   Dagmar is progressing well towards her goals. Goals updated today to reflect current  progress in therapy and Plan of Care.  Pt prognosis: good      Patient will continue to benefit from skilled outpatient physical therapy to address the deficits listed in the problem list box on initial evaluation, provide pt/family education and to maximize pt's level of independence in the home and community environment.     Patient's spiritual, cultural, and educational needs considered and patient agreeable to plan of care and goals.     Plan:  Continue per Plan of Care  Patient will be continually assessed and progressed as appropriate to achieve treatment goals.         Goals:   Active         Functional outcome         Patient will show a significant change in FOTO patient-reported outcome tool to demonstrate subjective improvement (Progressing)         Start:  03/10/25    Expected End:  06/02/25              Patient stated goal: Improve her low back and neck pain  (Progressing)         Start:  03/10/25    Expected End:  06/02/25              Patient will demonstrate independence in home program for support of progression (Progressing)         Start:  03/10/25    Expected End:  06/02/25                 Pain         Patient will report pain of 2/10 demonstrating a reduction of overall pain (Progressing)         Start:  03/10/25    Expected End:  06/02/25              Patient will report a 2 point reduction in pain while performing ADLs (Progressing)         Start:  03/10/25    Expected End:  06/02/25                 Range of Motion         Patient will achieve cervical flexion ROM 60 degrees (Progressing)         Start:  03/10/25    Expected End:  06/02/25              Patient will achieve full cervical retraction without pain (Progressing)         Start:  03/10/25    Expected End:  06/02/25              Patient will achieve spinal flexion to reduce pain with lifting/carrying. (Progressing)         Start:  03/10/25    Expected End:  06/02/25              Patient will achieve spinal extension to 30 degrees  without pain radiating to Les (Progressing)         Start:  03/10/25    Expected End:  06/02/25                 Strength         Patient will demonstrate 4/5 cervical strength (Progressing)         Start:  03/10/25    Expected End:  06/02/25              Patient will demonstrate 5/5 spinal strength (Progressing)         Start:  03/10/25    Expected End:  06/02/25                  Sheri Fitzpatrick, PT, DPT  Board-Certified Clinical Specialist in Women's Health Physical Therapy

## 2025-04-01 ENCOUNTER — CLINICAL SUPPORT (OUTPATIENT)
Dept: REHABILITATION | Facility: OTHER | Age: 25
End: 2025-04-01
Payer: MEDICAID

## 2025-04-01 DIAGNOSIS — N94.10 DYSPAREUNIA, FEMALE: ICD-10-CM

## 2025-04-01 DIAGNOSIS — M62.81 WEAKNESS OF TRUNK MUSCULATURE: Primary | ICD-10-CM

## 2025-04-01 DIAGNOSIS — M53.82 NECK MUSCLE WEAKNESS: ICD-10-CM

## 2025-04-01 DIAGNOSIS — R29.898 DECREASED RANGE OF MOTION OF NECK: ICD-10-CM

## 2025-04-01 PROCEDURE — 97110 THERAPEUTIC EXERCISES: CPT | Mod: PN

## 2025-04-01 PROCEDURE — 97110 THERAPEUTIC EXERCISES: CPT | Mod: PN | Performed by: PHYSICAL THERAPIST

## 2025-04-01 NOTE — PROGRESS NOTES
"  Outpatient Rehab    Physical Therapy Treatment Note    Patient Name: Dagmar Quach  MRN: [id number]  YOB: 2000  Encounter Date: 4/1/2025    Therapy Diagnosis:   Encounter Diagnoses   Name Primary?    Weakness of trunk musculature Yes    Decreased range of motion of neck     Neck muscle weakness     Dyspareunia, female        Physician: Belén Hidalgo, *    Physician Orders: Eval and Treat  Medical Diagnosis: Postpartum care and examination [Z39.2]     Visit # / Visits Authorized:  4 / 20  Date of Evaluation: 3/10/2025  Insurance Authorization Period: 3/5/2025 to 3/5/2026  Plan of Care Certification:  3/10/2025 to 6/5/2025      Time In: 1045   Time Out: 1140  Total Time: 55   Total Billable Time: 55 minutes    FOTO:  Intake Score:  %  Survey Score 1:  %  Survey Score 2:  %         Subjective   That she is doing a little better today. Doing well with HEP at home.  Pain reported as 2/10.      Objective     Treatment:  Manual Therapy  MT 1: Supine Thoracic HVLA  MT 2: Right C2/3 HVLA    Therapeutic Exercise  TE 1: Supine Chin Tuck Head lift 2 x 10 reps  TE 2: Quadruped Rocking 2 x 10 reps  TE 3: Cat Cow 2 x 10 reps  TE 4: Supine Sciatic Nerve glides 30x's each  TE 5: Bridges 2 x 10 reps  TE 6: Chin Tuck with Biofeedback 10 x 10" hold  TE 7: Wall Slides with Shrugs 20x's  TE 8: Banded Rows 3 x 10 reps    Assessment & Plan      Patient Goal for Therapy (PT): Improve her low back and neck pain  Prognosis: Good  Prognosis Details: Good candidate for Physical therapy.  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 Patient will benefit from skilled therapy services to address impairments listed above  Patient will continue to benefit from skilled outpatient physical therapy to address the deficits listed in the problem list box on initial evaluation, provide pt/family education and to maximize pt's level of independence " in the home and community environment.     Patient's spiritual, cultural, and educational needs considered and patient agreeable to plan of care and goals.     Education  Education was done with Patient. The patient's learning style includes Demonstration. The patient Demonstrates understanding and Verbalizes understanding.         Patient provided education on the importance of compliance with HEP, diagnosis and prognosis, and intended duration/frequency of physical therapy plan of care. Shared-decision making between evaluating therapist and patient utilized to determine therapeutic intervention selection, plan of care parameters, and continued monitoring of signs/symptoms.         Goals:   Active       Functional outcome       Patient will show a significant change in FOTO patient-reported outcome tool to demonstrate subjective improvement (Progressing)       Start:  03/10/25    Expected End:  06/02/25            Patient stated goal: Improve her low back and neck pain  (Progressing)       Start:  03/10/25    Expected End:  06/02/25            Patient will demonstrate independence in home program for support of progression (Progressing)       Start:  03/10/25    Expected End:  06/02/25               Pain       Patient will report pain of 2/10 demonstrating a reduction of overall pain (Progressing)       Start:  03/10/25    Expected End:  06/02/25            Patient will report a 2 point reduction in pain while performing ADLs (Progressing)       Start:  03/10/25    Expected End:  06/02/25               Range of Motion       Patient will achieve cervical flexion ROM 60 degrees (Progressing)       Start:  03/10/25    Expected End:  06/02/25            Patient will achieve full cervical retraction without pain (Progressing)       Start:  03/10/25    Expected End:  06/02/25            Patient will achieve spinal flexion to reduce pain with lifting/carrying. (Progressing)       Start:  03/10/25    Expected End:   06/02/25            Patient will achieve spinal extension to 30 degrees without pain radiating to Les (Progressing)       Start:  03/10/25    Expected End:  06/02/25               Strength       Patient will demonstrate 4/5 cervical strength (Progressing)       Start:  03/10/25    Expected End:  06/02/25            Patient will demonstrate 5/5 spinal strength (Progressing)       Start:  03/10/25    Expected End:  06/02/25                Joao Matt PT, GALLOT

## 2025-04-15 ENCOUNTER — PATIENT MESSAGE (OUTPATIENT)
Dept: REHABILITATION | Facility: OTHER | Age: 25
End: 2025-04-15

## 2025-04-15 ENCOUNTER — CLINICAL SUPPORT (OUTPATIENT)
Dept: REHABILITATION | Facility: OTHER | Age: 25
End: 2025-04-15
Payer: MEDICAID

## 2025-04-15 DIAGNOSIS — M53.82 NECK MUSCLE WEAKNESS: ICD-10-CM

## 2025-04-15 DIAGNOSIS — N94.10 DYSPAREUNIA, FEMALE: ICD-10-CM

## 2025-04-15 DIAGNOSIS — M62.81 WEAKNESS OF TRUNK MUSCULATURE: Primary | ICD-10-CM

## 2025-04-15 DIAGNOSIS — R29.898 DECREASED RANGE OF MOTION OF NECK: ICD-10-CM

## 2025-04-15 PROCEDURE — 97110 THERAPEUTIC EXERCISES: CPT | Mod: PN

## 2025-04-15 NOTE — PROGRESS NOTES
"  Outpatient Rehab    Physical Therapy Treatment Note    Patient Name: Dagmar Quach  MRN: [id number]  YOB: 2000  Encounter Date: 4/15/2025    Therapy Diagnosis:   Encounter Diagnoses   Name Primary?    Weakness of trunk musculature Yes    Decreased range of motion of neck     Neck muscle weakness     Dyspareunia, female          Physician: Belén Hidalgo, *    Physician Orders: Eval and Treat  Medical Diagnosis: Postpartum care and examination [Z39.2]     Visit # / Visits Authorized:  5 / 20  Date of Evaluation: 3/10/2025  Insurance Authorization Period: 3/5/2025 to 3/5/2026  Plan of Care Certification:  3/10/2025 to 6/5/2025      Time In: 1000   Time Out: 1055  Total Time: 55   Total Billable Time: 55 minutes    FOTO:  Intake Score:  %  Survey Score 1:  %  Survey Score 2:  %         Subjective   She is doing well. Still has some mid back pain when lifting items at home.  Pain reported as 2/10.      Objective     Treatment:  Manual Therapy  MT 1: Supine Thoracic HVLA  MT 2: Right C2/3 HVLA    Therapeutic Exercise  TE 1: Supine Chin Tuck Head lift 2 x 10 reps  TE 2: Quadruped Rocking 2 x 10 reps  TE 3: Cat Cow 2 x 10 reps  TE 4: Supine Sciatic Nerve glides 30x's each  TE 5: Bridges 2 x 10 reps  TE 6: Chin Tuck with Biofeedback 10 x 10" hold  TE 7: Wall Slides with Shrugs 20x's  TE 8: Banded Rows 3 x 10 reps  TE 9: TrA with Biofeedback 2 rounds 10 x 10" hold    Assessment & Plan      Patient Goal for Therapy (PT): Improve her low back and neck pain  Prognosis: Good  Prognosis Details: Good candidate for Physical therapy.  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 Patient will benefit from skilled therapy services to address impairments listed above  Patient will continue to benefit from skilled outpatient physical therapy to address the deficits listed in the problem list box on initial evaluation, provide " pt/family education and to maximize pt's level of independence in the home and community environment.     Patient's spiritual, cultural, and educational needs considered and patient agreeable to plan of care and goals.     Education  Education was done with Patient. The patient's learning style includes Demonstration. The patient Demonstrates understanding and Verbalizes understanding.         Patient provided education on the importance of compliance with HEP, diagnosis and prognosis, and intended duration/frequency of physical therapy plan of care. Shared-decision making between evaluating therapist and patient utilized to determine therapeutic intervention selection, plan of care parameters, and continued monitoring of signs/symptoms.           Goals:   Active       Functional outcome       Patient will show a significant change in FOTO patient-reported outcome tool to demonstrate subjective improvement (Progressing)       Start:  03/10/25    Expected End:  06/02/25            Patient stated goal: Improve her low back and neck pain  (Progressing)       Start:  03/10/25    Expected End:  06/02/25            Patient will demonstrate independence in home program for support of progression (Progressing)       Start:  03/10/25    Expected End:  06/02/25               Pain       Patient will report pain of 2/10 demonstrating a reduction of overall pain (Progressing)       Start:  03/10/25    Expected End:  06/02/25            Patient will report a 2 point reduction in pain while performing ADLs (Progressing)       Start:  03/10/25    Expected End:  06/02/25               Range of Motion       Patient will achieve cervical flexion ROM 60 degrees (Progressing)       Start:  03/10/25    Expected End:  06/02/25            Patient will achieve full cervical retraction without pain (Progressing)       Start:  03/10/25    Expected End:  06/02/25            Patient will achieve spinal flexion to reduce pain with  lifting/carrying. (Progressing)       Start:  03/10/25    Expected End:  06/02/25            Patient will achieve spinal extension to 30 degrees without pain radiating to Les (Progressing)       Start:  03/10/25    Expected End:  06/02/25               Strength       Patient will demonstrate 4/5 cervical strength (Progressing)       Start:  03/10/25    Expected End:  06/02/25            Patient will demonstrate 5/5 spinal strength (Progressing)       Start:  03/10/25    Expected End:  06/02/25                Joao Matt PT, GALLOT